# Patient Record
Sex: FEMALE | Race: WHITE
[De-identification: names, ages, dates, MRNs, and addresses within clinical notes are randomized per-mention and may not be internally consistent; named-entity substitution may affect disease eponyms.]

---

## 2019-06-13 ENCOUNTER — HOSPITAL ENCOUNTER (INPATIENT)
Dept: HOSPITAL 91 - E/R | Age: 84
LOS: 6 days | Discharge: HOME | DRG: 190 | End: 2019-06-19
Payer: SELF-PAY

## 2019-06-13 ENCOUNTER — HOSPITAL ENCOUNTER (INPATIENT)
Dept: HOSPITAL 10 - E/R | Age: 84
LOS: 6 days | Discharge: HOME | DRG: 190 | End: 2019-06-19
Attending: INTERNAL MEDICINE
Payer: SELF-PAY

## 2019-06-13 VITALS
WEIGHT: 136.03 LBS | WEIGHT: 136.03 LBS | HEIGHT: 60 IN | HEIGHT: 60 IN | BODY MASS INDEX: 26.71 KG/M2 | BODY MASS INDEX: 26.71 KG/M2

## 2019-06-13 VITALS — RESPIRATION RATE: 20 BRPM | SYSTOLIC BLOOD PRESSURE: 153 MMHG | HEART RATE: 116 BPM | DIASTOLIC BLOOD PRESSURE: 65 MMHG

## 2019-06-13 DIAGNOSIS — N39.0: ICD-10-CM

## 2019-06-13 DIAGNOSIS — Z86.11: ICD-10-CM

## 2019-06-13 DIAGNOSIS — J96.01: ICD-10-CM

## 2019-06-13 DIAGNOSIS — J44.1: Primary | ICD-10-CM

## 2019-06-13 DIAGNOSIS — J44.0: ICD-10-CM

## 2019-06-13 DIAGNOSIS — J20.9: ICD-10-CM

## 2019-06-13 DIAGNOSIS — J84.10: ICD-10-CM

## 2019-06-13 DIAGNOSIS — B96.4: ICD-10-CM

## 2019-06-13 LAB
ABNORMAL IP MESSAGE: 1
ADD MAN DIFF?: NO
ADD MAN DIFF?: YES
ADD UMIC: YES
ALANINE AMINOTRANSFERASE: 19 IU/L (ref 13–69)
ALANINE AMINOTRANSFERASE: 21 IU/L (ref 13–69)
ALBUMIN/GLOBULIN RATIO: 1.24
ALBUMIN/GLOBULIN RATIO: 1.25
ALBUMIN: 4 G/DL (ref 3.3–4.9)
ALBUMIN: 4.1 G/DL (ref 3.3–4.9)
ALKALINE PHOSPHATASE: 105 IU/L (ref 42–121)
ALKALINE PHOSPHATASE: 113 IU/L (ref 42–121)
ANION GAP: 6 (ref 5–13)
ANION GAP: 9 (ref 5–13)
ASPARTATE AMINO TRANSFERASE: 28 IU/L (ref 15–46)
ASPARTATE AMINO TRANSFERASE: 31 IU/L (ref 15–46)
B-TYPE NATRIURETIC PEPTIDE: 92 PG/ML (ref 0–450)
BAND NEUTROPHILS #M: 0.4 10^3/UL (ref 0–0.6)
BAND NEUTROPHILS % (M): 3 % (ref 0–4)
BASOPHIL #: 0.1 10^3/UL (ref 0–0.1)
BASOPHILS %: 0.4 % (ref 0–2)
BILIRUBIN,DIRECT: 0 MG/DL (ref 0–0.2)
BILIRUBIN,DIRECT: 0 MG/DL (ref 0–0.2)
BILIRUBIN,TOTAL: 0.6 MG/DL (ref 0.2–1.3)
BILIRUBIN,TOTAL: 0.6 MG/DL (ref 0.2–1.3)
BLOOD UREA NITROGEN: 10 MG/DL (ref 7–20)
BLOOD UREA NITROGEN: 11 MG/DL (ref 7–20)
CALCIUM: 8.7 MG/DL (ref 8.4–10.2)
CALCIUM: 8.9 MG/DL (ref 8.4–10.2)
CARBON DIOXIDE: 26 MMOL/L (ref 21–31)
CARBON DIOXIDE: 28 MMOL/L (ref 21–31)
CHLORIDE: 102 MMOL/L (ref 97–110)
CHLORIDE: 103 MMOL/L (ref 97–110)
CK INDEX: 4.2
CK-MB: 2.27 NG/ML (ref 0–2.4)
CREATINE KINASE: 54 IU/L (ref 23–200)
CREATININE: 0.61 MG/DL (ref 0.44–1)
CREATININE: 0.69 MG/DL (ref 0.44–1)
EOSINOPHILS #: 0.1 10^3/UL (ref 0–0.5)
EOSINOPHILS %: 0.7 % (ref 0–7)
GLOBULIN: 3.2 G/DL (ref 1.3–3.2)
GLOBULIN: 3.3 G/DL (ref 1.3–3.2)
GLUCOSE: 113 MG/DL (ref 70–220)
GLUCOSE: 225 MG/DL (ref 70–220)
HEMATOCRIT: 41 % (ref 37–47)
HEMATOCRIT: 41.1 % (ref 37–47)
HEMOGLOBIN A1C: 5.9 % (ref 0–5.9)
HEMOGLOBIN: 13.2 G/DL (ref 12–16)
HEMOGLOBIN: 13.4 G/DL (ref 12–16)
IMMATURE GRANS #M: 0.05 10^3/UL (ref 0–0.03)
IMMATURE GRANS #M: 0.05 10^3/UL (ref 0–0.03)
IMMATURE GRANS % (M): 0.3 % (ref 0–0.43)
IMMATURE GRANS % (M): 0.3 % (ref 0–0.43)
INR: 0.94
LACTIC ACID: 1.8 MMOL/L (ref 0.5–2)
LIPASE: 56 U/L (ref 23–300)
LYMPHOCYTES #: 2.5 10^3/UL (ref 0.8–2.9)
LYMPHOCYTES #M: 0.3 10^3/UL (ref 0.8–2.9)
LYMPHOCYTES % (M): 2 % (ref 15–51)
LYMPHOCYTES %: 16.6 % (ref 15–51)
MEAN CORPUSCULAR HEMOGLOBIN: 29.8 PG (ref 29–33)
MEAN CORPUSCULAR HEMOGLOBIN: 30.2 PG (ref 29–33)
MEAN CORPUSCULAR HGB CONC: 32.2 G/DL (ref 32–37)
MEAN CORPUSCULAR HGB CONC: 32.6 G/DL (ref 32–37)
MEAN CORPUSCULAR VOLUME: 92.6 FL (ref 82–101)
MEAN CORPUSCULAR VOLUME: 92.6 FL (ref 82–101)
MEAN PLATELET VOLUME: 9.3 FL (ref 7.4–10.4)
MEAN PLATELET VOLUME: 9.5 FL (ref 7.4–10.4)
MONOCYTE #: 1.2 10^3/UL (ref 0.3–0.9)
MONOCYTES %: 8.1 % (ref 0–11)
NEUTROPHIL #: 10.9 10^3/UL (ref 1.6–7.5)
NEUTROPHILS %: 73.9 % (ref 39–77)
NUCLEATED RED BLOOD CELLS #: 0 10^3/UL (ref 0–0)
NUCLEATED RED BLOOD CELLS%: 0 /100WBC (ref 0–0)
NUCLEATED RED BLOOD CELLS%: 0 /100WBC (ref 0–0)
PARTIAL THROMBOPLASTIN TIME: 28.1 SEC (ref 23–35)
PLATELET COUNT: 236 10^3/UL (ref 140–415)
PLATELET COUNT: 238 10^3/UL (ref 140–415)
PLATELET ESTIMATE: NORMAL
POLYCHROMASIA: (no result) (ref 0–0)
POSITIVE DIFF: (no result)
POTASSIUM: 3.7 MMOL/L (ref 3.5–5.1)
POTASSIUM: 3.7 MMOL/L (ref 3.5–5.1)
PROTIME: 12.7 SEC (ref 11.9–14.9)
PT RATIO: 1
RED BLOOD COUNT: 4.43 10^6/UL (ref 4.2–5.4)
RED BLOOD COUNT: 4.44 10^6/UL (ref 4.2–5.4)
RED CELL DISTRIBUTION WIDTH: 13.6 % (ref 11.5–14.5)
RED CELL DISTRIBUTION WIDTH: 13.7 % (ref 11.5–14.5)
SEG NEUT #M: 15.3 10^3/UL (ref 1.6–7.5)
SEGMENTED NEUTROPHILS (M) %: 95 % (ref 39–77)
SODIUM: 137 MMOL/L (ref 135–144)
SODIUM: 137 MMOL/L (ref 135–144)
THYROID STIMULATING HORMONE: 1.87 MIU/L (ref 0.47–4.68)
TOTAL PROTEIN: 7.2 G/DL (ref 6.1–8.1)
TOTAL PROTEIN: 7.4 G/DL (ref 6.1–8.1)
TROPONIN-I: < 0.012 NG/ML (ref 0–0.12)
TROPONIN-I: < 0.012 NG/ML (ref 0–0.12)
UR ASCORBIC ACID: NEGATIVE MG/DL
UR BACTERIA: (no result) /HPF
UR BILIRUBIN (DIP): NEGATIVE MG/DL
UR BLOOD (DIP): (no result) MG/DL
UR CLARITY: (no result)
UR COLOR: YELLOW
UR GLUCOSE (DIP): NEGATIVE MG/DL
UR KETONES (DIP): NEGATIVE MG/DL
UR LEUKOCYTE ESTERASE (DIP): (no result) LEU/UL
UR MUCUS: (no result) /HPF
UR NITRITE (DIP): NEGATIVE MG/DL
UR PH (DIP): 5 (ref 5–9)
UR RBC: 17 /HPF (ref 0–5)
UR SPECIFIC GRAVITY (DIP): 1.01 (ref 1–1.03)
UR SQUAMOUS EPITHELIAL CELL: (no result) /HPF
UR TOTAL PROTEIN (DIP): NEGATIVE MG/DL
UR UROBILINOGEN (DIP): (no result) MG/DL
UR WBC: 54 /HPF (ref 0–5)
WHITE BLOOD COUNT: 14.8 10^3/UL (ref 4.8–10.8)
WHITE BLOOD COUNT: 16 10^3/UL (ref 4.8–10.8)

## 2019-06-13 PROCEDURE — 71260 CT THORAX DX C+: CPT

## 2019-06-13 PROCEDURE — 36600 WITHDRAWAL OF ARTERIAL BLOOD: CPT

## 2019-06-13 PROCEDURE — 94664 DEMO&/EVAL PT USE INHALER: CPT

## 2019-06-13 PROCEDURE — 85610 PROTHROMBIN TIME: CPT

## 2019-06-13 PROCEDURE — 96374 THER/PROPH/DIAG INJ IV PUSH: CPT

## 2019-06-13 PROCEDURE — 87116 MYCOBACTERIA CULTURE: CPT

## 2019-06-13 PROCEDURE — 87400 INFLUENZA A/B EACH AG IA: CPT

## 2019-06-13 PROCEDURE — 83605 ASSAY OF LACTIC ACID: CPT

## 2019-06-13 PROCEDURE — 80048 BASIC METABOLIC PNL TOTAL CA: CPT

## 2019-06-13 PROCEDURE — 83880 ASSAY OF NATRIURETIC PEPTIDE: CPT

## 2019-06-13 PROCEDURE — 89220 SPUTUM SPECIMEN COLLECTION: CPT

## 2019-06-13 PROCEDURE — 86140 C-REACTIVE PROTEIN: CPT

## 2019-06-13 PROCEDURE — 87556 M.TUBERCULO DNA AMP PROBE: CPT

## 2019-06-13 PROCEDURE — 97161 PT EVAL LOW COMPLEX 20 MIN: CPT

## 2019-06-13 PROCEDURE — 87086 URINE CULTURE/COLONY COUNT: CPT

## 2019-06-13 PROCEDURE — 82803 BLOOD GASES ANY COMBINATION: CPT

## 2019-06-13 PROCEDURE — 36415 COLL VENOUS BLD VENIPUNCTURE: CPT

## 2019-06-13 PROCEDURE — 86480 TB TEST CELL IMMUN MEASURE: CPT

## 2019-06-13 PROCEDURE — 83036 HEMOGLOBIN GLYCOSYLATED A1C: CPT

## 2019-06-13 PROCEDURE — 99285 EMERGENCY DEPT VISIT HI MDM: CPT

## 2019-06-13 PROCEDURE — 71045 X-RAY EXAM CHEST 1 VIEW: CPT

## 2019-06-13 PROCEDURE — 83735 ASSAY OF MAGNESIUM: CPT

## 2019-06-13 PROCEDURE — 81001 URINALYSIS AUTO W/SCOPE: CPT

## 2019-06-13 PROCEDURE — 82306 VITAMIN D 25 HYDROXY: CPT

## 2019-06-13 PROCEDURE — 82553 CREATINE MB FRACTION: CPT

## 2019-06-13 PROCEDURE — 94640 AIRWAY INHALATION TREATMENT: CPT

## 2019-06-13 PROCEDURE — 80053 COMPREHEN METABOLIC PANEL: CPT

## 2019-06-13 PROCEDURE — 93005 ELECTROCARDIOGRAM TRACING: CPT

## 2019-06-13 PROCEDURE — 93306 TTE W/DOPPLER COMPLETE: CPT

## 2019-06-13 PROCEDURE — 84443 ASSAY THYROID STIM HORMONE: CPT

## 2019-06-13 PROCEDURE — 83690 ASSAY OF LIPASE: CPT

## 2019-06-13 PROCEDURE — 84100 ASSAY OF PHOSPHORUS: CPT

## 2019-06-13 PROCEDURE — 85730 THROMBOPLASTIN TIME PARTIAL: CPT

## 2019-06-13 PROCEDURE — 85025 COMPLETE CBC W/AUTO DIFF WBC: CPT

## 2019-06-13 PROCEDURE — 87040 BLOOD CULTURE FOR BACTERIA: CPT

## 2019-06-13 PROCEDURE — 82550 ASSAY OF CK (CPK): CPT

## 2019-06-13 PROCEDURE — 84484 ASSAY OF TROPONIN QUANT: CPT

## 2019-06-13 PROCEDURE — 85651 RBC SED RATE NONAUTOMATED: CPT

## 2019-06-13 RX ADMIN — IPRATROPIUM BROMIDE 1 MG: 0.5 SOLUTION RESPIRATORY (INHALATION) at 00:48

## 2019-06-13 RX ADMIN — LEVALBUTEROL 1 MG: 1.25 SOLUTION, CONCENTRATE RESPIRATORY (INHALATION) at 05:45

## 2019-06-13 RX ADMIN — VANCOMYCIN HYDROCHLORIDE 1 MLS/HR: 500 INJECTION, POWDER, LYOPHILIZED, FOR SOLUTION INTRAVENOUS at 09:55

## 2019-06-13 RX ADMIN — ALBUTEROL SULFATE 1 MG: 2.5 SOLUTION RESPIRATORY (INHALATION) at 00:48

## 2019-06-13 RX ADMIN — IPRATROPIUM BROMIDE 1 MG: 0.5 SOLUTION RESPIRATORY (INHALATION) at 20:53

## 2019-06-13 RX ADMIN — DEXAMETHASONE SODIUM PHOSPHATE 1 MG: 10 INJECTION, SOLUTION INTRAMUSCULAR; INTRAVENOUS at 01:28

## 2019-06-13 RX ADMIN — IPRATROPIUM BROMIDE 1 MG: 0.5 SOLUTION RESPIRATORY (INHALATION) at 05:45

## 2019-06-13 RX ADMIN — LEVALBUTEROL SCH MG: 1.25 SOLUTION, CONCENTRATE RESPIRATORY (INHALATION) at 14:15

## 2019-06-13 RX ADMIN — AZTREONAM SCH MLS/HR: 1 INJECTION, POWDER, LYOPHILIZED, FOR SOLUTION INTRAMUSCULAR; INTRAVENOUS at 23:06

## 2019-06-13 RX ADMIN — FAMOTIDINE SCH MG: 20 TABLET ORAL at 09:00

## 2019-06-13 RX ADMIN — HEPARIN SODIUM 1 UNIT: 5000 INJECTION, SOLUTION INTRAVENOUS; SUBCUTANEOUS at 15:54

## 2019-06-13 RX ADMIN — IPRATROPIUM BROMIDE 1 MG: 0.5 SOLUTION RESPIRATORY (INHALATION) at 09:26

## 2019-06-13 RX ADMIN — ACETAMINOPHEN 1 MG: 325 TABLET, FILM COATED ORAL at 01:24

## 2019-06-13 RX ADMIN — MAGNESIUM OXIDE TAB 400 MG (241.3 MG ELEMENTAL MG) 1 MG: 400 (241.3 MG) TAB at 18:48

## 2019-06-13 RX ADMIN — LEVOFLOXACIN 1 MLS/HR: 500 INJECTION, SOLUTION INTRAVENOUS at 03:29

## 2019-06-13 RX ADMIN — IPRATROPIUM BROMIDE 1 MG: 0.5 SOLUTION RESPIRATORY (INHALATION) at 03:26

## 2019-06-13 RX ADMIN — FAMOTIDINE 1 MG: 20 TABLET ORAL at 04:36

## 2019-06-13 RX ADMIN — LEVALBUTEROL SCH MG: 1.25 SOLUTION, CONCENTRATE RESPIRATORY (INHALATION) at 05:45

## 2019-06-13 RX ADMIN — MAGNESIUM OXIDE TAB 400 MG (241.3 MG ELEMENTAL MG) SCH MG: 400 (241.3 MG) TAB at 18:48

## 2019-06-13 RX ADMIN — LEVALBUTEROL 1 MG: 1.25 SOLUTION, CONCENTRATE RESPIRATORY (INHALATION) at 09:26

## 2019-06-13 RX ADMIN — HEPARIN SODIUM 1 UNIT: 5000 INJECTION, SOLUTION INTRAVENOUS; SUBCUTANEOUS at 06:20

## 2019-06-13 RX ADMIN — ALBUTEROL SULFATE 1 MG: 2.5 SOLUTION RESPIRATORY (INHALATION) at 03:27

## 2019-06-13 RX ADMIN — MAGNESIUM OXIDE TAB 400 MG (241.3 MG ELEMENTAL MG) SCH MG: 400 (241.3 MG) TAB at 20:25

## 2019-06-13 RX ADMIN — HEPARIN SODIUM SCH UNIT: 5000 INJECTION, SOLUTION INTRAVENOUS; SUBCUTANEOUS at 15:54

## 2019-06-13 RX ADMIN — AZTREONAM 1 MLS/HR: 1 INJECTION, POWDER, LYOPHILIZED, FOR SOLUTION INTRAMUSCULAR; INTRAVENOUS at 23:06

## 2019-06-13 RX ADMIN — SODIUM CHLORIDE 30 MG/ML INHALATION SOLUTION 1 ML: 30 SOLUTION INHALANT at 09:26

## 2019-06-13 RX ADMIN — IPRATROPIUM BROMIDE 1 MG: 0.5 SOLUTION RESPIRATORY (INHALATION) at 16:05

## 2019-06-13 RX ADMIN — LEVALBUTEROL 1 MG: 1.25 SOLUTION, CONCENTRATE RESPIRATORY (INHALATION) at 16:05

## 2019-06-13 RX ADMIN — AZTREONAM 1 MLS/HR: 1 INJECTION, POWDER, LYOPHILIZED, FOR SOLUTION INTRAMUSCULAR; INTRAVENOUS at 08:16

## 2019-06-13 RX ADMIN — LEVALBUTEROL SCH MG: 1.25 SOLUTION, CONCENTRATE RESPIRATORY (INHALATION) at 09:26

## 2019-06-13 RX ADMIN — LEVALBUTEROL 1 MG: 1.25 SOLUTION, CONCENTRATE RESPIRATORY (INHALATION) at 14:15

## 2019-06-13 RX ADMIN — METHYLPREDNISOLONE SODIUM SUCCINATE 1 MG: 125 INJECTION, POWDER, FOR SOLUTION INTRAMUSCULAR; INTRAVENOUS at 18:47

## 2019-06-13 RX ADMIN — LEVALBUTEROL 1 MG: 1.25 SOLUTION, CONCENTRATE RESPIRATORY (INHALATION) at 20:53

## 2019-06-13 RX ADMIN — IOHEXOL 1 ML: 300 INJECTION, SOLUTION INTRAVENOUS at 02:50

## 2019-06-13 RX ADMIN — SODIUM CHLORIDE 1 ML: 9 INJECTION, SOLUTION INTRAMUSCULAR; INTRAVENOUS; SUBCUTANEOUS at 02:50

## 2019-06-13 RX ADMIN — MAGNESIUM OXIDE TAB 400 MG (241.3 MG ELEMENTAL MG) 1 MG: 400 (241.3 MG) TAB at 20:25

## 2019-06-13 RX ADMIN — HEPARIN SODIUM SCH UNIT: 5000 INJECTION, SOLUTION INTRAVENOUS; SUBCUTANEOUS at 06:20

## 2019-06-13 RX ADMIN — LIDOCAINE HYDROCHLORIDE 1 MLS/HR: 10 INJECTION, SOLUTION EPIDURAL; INFILTRATION; INTRACAUDAL; PERINEURAL at 09:55

## 2019-06-13 RX ADMIN — FAMOTIDINE SCH MG: 20 TABLET ORAL at 04:36

## 2019-06-13 RX ADMIN — FAMOTIDINE 1 MG: 20 TABLET ORAL at 09:00

## 2019-06-13 RX ADMIN — AZTREONAM SCH MLS/HR: 1 INJECTION, POWDER, LYOPHILIZED, FOR SOLUTION INTRAMUSCULAR; INTRAVENOUS at 08:16

## 2019-06-13 RX ADMIN — IPRATROPIUM BROMIDE 1 MG: 0.5 SOLUTION RESPIRATORY (INHALATION) at 14:15

## 2019-06-13 RX ADMIN — VASOPRESSIN 1: 20 INJECTION, SOLUTION INTRAMUSCULAR; SUBCUTANEOUS at 02:50

## 2019-06-13 RX ADMIN — LEVALBUTEROL SCH MG: 1.25 SOLUTION, CONCENTRATE RESPIRATORY (INHALATION) at 20:53

## 2019-06-13 RX ADMIN — LEVALBUTEROL SCH MG: 1.25 SOLUTION, CONCENTRATE RESPIRATORY (INHALATION) at 16:05

## 2019-06-13 NOTE — PN
Date/Time of Note


Date/Time of Note


DATE: 6/13/19 


TIME: 17:04





Assessment/Plan


VTE Prophylaxis


SCD contraindicated:  low risk/ambulating


Pharmacological prophylaxis:  LMWH





Lines/Catheters


IV Catheter Type (from Nrs):  Peripheral IV





Assessment/Plan


Hospital Course





Assessment and plan


1.  Dyspnea, fever, bronchitis versus pneumonia.  Antibiotics, follow-up on 


cultures


2.  History of tuberculosis.  Treated in [place] about 10 years ago.


3.  Weight loss 5 to 8 pounds.


4.  COPD?


5.  Acute hypoxic respiratory failure, stable continue antibiotics





S: Dyspnea low-grade fever.  Mild/moderate weight loss only.  States she was 


treated many years ago for tuberculosis.  She started medical therapy in 


[place] and finished treatment in [place].  Denies any ill contacts.  Did 


recently travel here from [place].  No edema chest pain.  Diet has been okay.  


[place] and [place].





O: vss; some tachycardia





Physical exam


No pallor adenopathy JVD


Regular some tachycardia no rub no gallop


Scattered wheeze bilaterally


Bowel sounds present nontender nondistended no RRG


No edema or Homans


Result Diagram:  


6/13/19 0430                                                                    


           6/13/19 0430





Results 24hrs





Laboratory Tests


Test
                 6/13/19
00:20  6/13/19
00:32  6/13/19
00:36  6/13/19
04:30


Urine Color        YELLOW


Urine Clarity
     SLIGHTLY
CLOUDY   
              
              



                   A


Urine pH                      5.0


Urine Specific              1.015


Gravity


Urine Ketones      NEGATIVE


Urine Nitrite      NEGATIVE


Urine Bilirubin    NEGATIVE


Urine                         1+  H


Urobilinogen


Urine Leukocyte               3+  H


Esterase


Urine Microscopic             17  H


RBC


Urine Microscopic             54  H


WBC


Urine Squamous     FEW  
            
              
              



Epithelial
Cells


Urine Bacteria     FEW  A


Urine Mucus        FEW  A


Urine Hemoglobin              2+  H


Urine Glucose      NEGATIVE


Urine Total        NEGATIVE


Protein


White Blood Count                          14.8  H                       16.0  H


Red Blood Count                             4.43                          4.44


Hemoglobin                                  13.2                          13.4


Hematocrit                                  41.0                          41.1


Mean Corpuscular                            92.6                          92.6


Volume


Mean Corpuscular                            29.8                          30.2


Hemoglobin


Mean Corpuscular   
                       32.2  
  
                    32.6  



Hemoglobin
Concen


t


Red Cell                                    13.7                          13.6


Distribution


Width


Platelet Count                               238                           236


Mean Platelet                                9.5                           9.3


Volume


Immature                                   0.300                         0.300


Granulocytes %


Neutrophils %                               73.9


Lymphocytes %                               16.6


Monocytes %                                  8.1


Eosinophils %                                0.7


Basophils %                                  0.4


Nucleated Red                                0.0                           0.0


Blood Cells %


Immature                                  0.050  H                      0.050  H


Granulocytes #


Neutrophils #                              10.9  H


Lymphocytes #                                2.5


Monocytes #                                 1.2  H


Eosinophils #                                0.1


Basophils #                                  0.1


Nucleated Red                                0.0


Blood Cells #


Prothrombin Time                            12.7


Prothrombin Time                             1.0


Ratio


INR International  
                       0.94  
  
              



Normalized
Ratio


Activated          
                       28.1  
  
              



Partial
Thrombopl


ast Time


Sodium Level                                 137                           137


Potassium Level                              3.7                           3.7


Chloride Level                               103                           102


Carbon Dioxide                                28                            26


Level


Anion Gap                                      6                             9


Blood Urea                                    11                            10


Nitrogen


Creatinine                                  0.69                          0.61


Est Glomerular     
                   
            
                



Filtrat


Rate
mL/min


Glucose Level                                113                         225  #H


Calcium Level                                8.9                           8.7


Total Bilirubin                              0.6                           0.6


Direct Bilirubin                            0.00                          0.00


Indirect                                     0.6                           0.6


Bilirubin


Aspartate Amino    
                         31  
  
                      28  



Transf
(AST/SGOT)


Alanine            
                         21  
  
                      19  



Aminotransferase



(ALT/SGPT)


Alkaline                                     105                           113


Phosphatase


Troponin I                           < 0.012


Total Protein                                7.4                           7.2


Albumin                                      4.1                           4.0


Globulin                                   3.30  H                        3.20


Albumin/Globulin                            1.24                          1.25


Ratio


Lipase                                        56


POC Venous                                                  0.9


Lactate


Segmented          
                 
              
                     95  H



Neutrophils


%
(Manual)


Band Neutrophils                                                             3


% (Manual)


Lymphocytes %                                                               2  L


(Manual)


Neutrophils #                                                            15.3  H


(Manual)


Band Neutrophils                                                           0.4


#


Lymphocytes                                                               0.3  L


(Manual)


Platelet Estimate                                                  NORMAL


Polychromasia                                                               1+


Hemoglobin A1c                                                             5.9


Lactic Acid Level                                                          1.8


B-Type                                                                      92


Natriuretic


Peptide


Thyroid            
                 
              
                   1.870  



Stimulating


Hormone
(TSH)


Test
                 6/13/19
08:00  6/13/19
08:21  
              



Blood Gas          Blood arterial
   
              
              



Specimen Source



Arterial Blood     6/13/2019
8:19:0  
              
              



Date Drawn
                    0 AM


Arterial Blood pH          7.427  
  
              
              



(Temp
corrected)


Arterial Blood             33.2  L
  
              
              



pCO2


(Temp
correct)


Arterial Blood             78.0  L
  
              
              



pO2


(Temp
corrected)


Arterial Blood              21.4  L


HCO3


Arterial Blood               -2.2


Base Excess


Arterial Blood             94.2  L
  
              
              



Oxygen
Saturation


Hector Test         ACCEPTAB


Arterial Blood     Right Radial  
   
              
              



Gas Puncture
Site


Arterial                     0.5  
  
              
              



Blood
Carboxyhemo


globin


Arterial Blood                0.5


Methemoglobin


Blood Gas A-a O2           169.0  H


Differential


Oxyhemoglobin                93.3


Percent


Blood Gas                    37.0


Temperature


Blood Gas                    20.0


Respiration Rate


Blood Gas Actual              20  
  
              
              



Respiration
Rate


Blood Gas          VENT - AC


Modality


FiO2                         40.0


Blood Gas Tidal             550.0


Volume


Blood Gas Low                 5.0


PEEP Setting


Blood Gas          CW


Notified Whom


Blood Gas          6/13/2019
8:27:0  
              
              



Notified Time
                 0 AM


Creatine Kinase                               54


Creatine Kinase                              4.2


Index


Creatinine Kinase                           2.27


MB (Mass)


Troponin I                           < 0.012








Exam/Review of Systems


Exam


Vitals





Vital Signs


  Date      Temp  Pulse  Resp  B/P (MAP)   Pulse Ox  O2          O2 Flow    FiO2


Time                                                 Delivery    Rate


   6/13/19          111    20                   100  Nasal             3.0


     16:06                                           Cannula


   6/13/19  97.9                   115/68


     14:00                           (84)


   6/13/19                                                                    21


     00:50








Results


Results 24hrs





Laboratory Tests


Test
                 6/13/19
00:20  6/13/19
00:32  6/13/19
00:36  6/13/19
04:30


Urine Color        YELLOW


Urine Clarity
     SLIGHTLY
CLOUDY   
              
              



                   A


Urine pH                      5.0


Urine Specific              1.015


Gravity


Urine Ketones      NEGATIVE


Urine Nitrite      NEGATIVE


Urine Bilirubin    NEGATIVE


Urine                         1+  H


Urobilinogen


Urine Leukocyte               3+  H


Esterase


Urine Microscopic             17  H


RBC


Urine Microscopic             54  H


WBC


Urine Squamous     FEW  
            
              
              



Epithelial
Cells


Urine Bacteria     FEW  A


Urine Mucus        FEW  A


Urine Hemoglobin              2+  H


Urine Glucose      NEGATIVE


Urine Total        NEGATIVE


Protein


White Blood Count                          14.8  H                       16.0  H


Red Blood Count                             4.43                          4.44


Hemoglobin                                  13.2                          13.4


Hematocrit                                  41.0                          41.1


Mean Corpuscular                            92.6                          92.6


Volume


Mean Corpuscular                            29.8                          30.2


Hemoglobin


Mean Corpuscular   
                       32.2  
  
                    32.6  



Hemoglobin
Concen


t


Red Cell                                    13.7                          13.6


Distribution


Width


Platelet Count                               238                           236


Mean Platelet                                9.5                           9.3


Volume


Immature                                   0.300                         0.300


Granulocytes %


Neutrophils %                               73.9


Lymphocytes %                               16.6


Monocytes %                                  8.1


Eosinophils %                                0.7


Basophils %                                  0.4


Nucleated Red                                0.0                           0.0


Blood Cells %


Immature                                  0.050  H                      0.050  H


Granulocytes #


Neutrophils #                              10.9  H


Lymphocytes #                                2.5


Monocytes #                                 1.2  H


Eosinophils #                                0.1


Basophils #                                  0.1


Nucleated Red                                0.0


Blood Cells #


Prothrombin Time                            12.7


Prothrombin Time                             1.0


Ratio


INR International  
                       0.94  
  
              



Normalized
Ratio


Activated          
                       28.1  
  
              



Partial
Thrombopl


ast Time


Sodium Level                                 137                           137


Potassium Level                              3.7                           3.7


Chloride Level                               103                           102


Carbon Dioxide                                28                            26


Level


Anion Gap                                      6                             9


Blood Urea                                    11                            10


Nitrogen


Creatinine                                  0.69                          0.61


Est Glomerular     
                   
            
                



Filtrat


Rate
mL/min


Glucose Level                                113                         225  #H


Calcium Level                                8.9                           8.7


Total Bilirubin                              0.6                           0.6


Direct Bilirubin                            0.00                          0.00


Indirect                                     0.6                           0.6


Bilirubin


Aspartate Amino    
                         31  
  
                      28  



Transf
(AST/SGOT)


Alanine            
                         21  
  
                      19  



Aminotransferase



(ALT/SGPT)


Alkaline                                     105                           113


Phosphatase


Troponin I                           < 0.012


Total Protein                                7.4                           7.2


Albumin                                      4.1                           4.0


Globulin                                   3.30  H                        3.20


Albumin/Globulin                            1.24                          1.25


Ratio


Lipase                                        56


POC Venous                                                  0.9


Lactate


Segmented          
                 
              
                     95  H



Neutrophils


%
(Manual)


Band Neutrophils                                                             3


% (Manual)


Lymphocytes %                                                               2  L


(Manual)


Neutrophils #                                                            15.3  H


(Manual)


Band Neutrophils                                                           0.4


#


Lymphocytes                                                               0.3  L


(Manual)


Platelet Estimate                                                  NORMAL


Polychromasia                                                               1+


Hemoglobin A1c                                                             5.9


Lactic Acid Level                                                          1.8


B-Type                                                                      92


Natriuretic


Peptide


Thyroid            
                 
              
                   1.870  



Stimulating


Hormone
(TSH)


Test
                 6/13/19
08:00  6/13/19
08:21  
              



Blood Gas          Blood arterial
   
              
              



Specimen Source



Arterial Blood     6/13/2019
8:19:0  
              
              



Date Drawn
                    0 AM


Arterial Blood pH          7.427  
  
              
              



(Temp
corrected)


Arterial Blood             33.2  L
  
              
              



pCO2


(Temp
correct)


Arterial Blood             78.0  L
  
              
              



pO2


(Temp
corrected)


Arterial Blood              21.4  L


HCO3


Arterial Blood               -2.2


Base Excess


Arterial Blood             94.2  L
  
              
              



Oxygen
Saturation


Hector Test         ACCEPTAB


Arterial Blood     Right Radial  
   
              
              



Gas Puncture
Site


Arterial                     0.5  
  
              
              



Blood
Carboxyhemo


globin


Arterial Blood                0.5


Methemoglobin


Blood Gas A-a O2           169.0  H


Differential


Oxyhemoglobin                93.3


Percent


Blood Gas                    37.0


Temperature


Blood Gas                    20.0


Respiration Rate


Blood Gas Actual              20  
  
              
              



Respiration
Rate


Blood Gas          VENT - AC


Modality


FiO2                         40.0


Blood Gas Tidal             550.0


Volume


Blood Gas Low                 5.0


PEEP Setting


Blood Gas          


Notified Whom


Blood Gas          6/13/2019
8:27:0  
              
              



Notified Time
                 0 AM


Creatine Kinase                               54


Creatine Kinase                              4.2


Index


Creatinine Kinase                           2.27


MB (Mass)


Troponin I                           < 0.012








Medications


Medication





Current Medications


Levalbuterol (Xopenex Neb) 1.25 mg Q4H RESP  THERAPY HHN  Last administered on 


6/13/19at 16:05; Admin Dose 1.25 MG;  Start 6/13/19 at 05:00


Ipratropium Bromide (Atrovent 0.02% (Neb)) 0.5 mg Q4H RESP  THERAPY HHN  Last 


administered on 6/13/19at 16:05; Admin Dose 0.5 MG;  Start 6/13/19 at 05:00


Famotidine (Pepcid) 20 mg BID PO  Last administered on 6/13/19at 04:36; Admin 


Dose 20 MG;  Start 6/13/19 at 03:00


IV Flush (NS 3 ml) 3 ml PER PROTOCOL IV ;  Start 6/13/19 at 03:00


Ondansetron HCl (Zofran Inj) 4 mg Q6H  PRN IV NAUSEA/VOMITING;  Start 6/13/19 at


03:00


Acetaminophen (Tylenol Tab) 650 mg Q6H  PRN PO .PAIN 1-3 OR TEMP;  Start 6/13/19


at 03:00


Docusate Sodium (Colace) 100 mg Q12H  PRN PO .CONSTIPATION;  Start 6/13/19 at 


03:00


Bisacodyl (Dulcolax) 5 mg DAILY  PRN PO .CONSTIPATION;  Start 6/13/19 at 03:00


Heparin Sodium (Porcine) (Heparin  (5000 Units/1ml)) 5,000 unit Q8 SC  Last 


administered on 6/13/19at 15:54; Admin Dose 5,000 UNIT;  Start 6/13/19 at 06:00


Aztreonam 50 ml @  100 mls/hr Q12 IVPB  Last administered on 6/13/19at 08:16; 


Admin Dose 100 MLS/HR;  Start 6/13/19 at 09:00


Vancomycin HCl (Vanco Iv Per Pharmacy) VANCOMYCIN PER PHARMACY PER PROTOCOL XX ;


 Start 6/13/19 at 07:30


Prednisone (Prednisone) 40 mg DAILY PO  Last administered on 6/13/19at 09:56; 


Admin Dose 40 MG;  Start 6/13/19 at 09:00


Vancomycin HCl 1.25 gm/Sodium Chloride 250 ml @  83.333 mls/ hr ONCE IVPB  Last 


administered on 6/13/19at 09:55; Admin Dose 83.333 MLS/HR;  Start 6/13/19 at 


09:00;  Stop 6/13/19 at 18:00


Vancomycin/Sodium Chloride 250 ml @  125 mls/hr Q24H IVPB ;  Start 6/14/19 at 


10:00











MELL PRADO MD         Jun 13, 2019 17:06

## 2019-06-13 NOTE — HP
Date/Time of Note


Date/Time of Note


DATE: 6/13/19 


TIME: 02:45





Assessment/Plan


VTE Prophylaxis


Pharmacological prophylaxis:  heparin





Lines/Catheters


IV Catheter Type (from [facility]):  Peripheral IV





Assessment/Plan


Hospital Course


This is a 86-year-old female being admitted to the telemetry floor for:





#1  Suspect sepsis: Patient did present with a temp of 100.2.  She was also 


tachycardic, tachynpniec x-rays concerning for possible underlying pneumonia 


bronchitis.  Given also patient's night sweats and recent arrival from [place] we


will also assess for underlying tuberculosis.  Broad-spectrum antibiotics of 


vancomycin and aztreonam at the current time given patient's penicillin allergy.


 Will isolate the patient.  We will test for acid-fast bacilli every 8 hours x3,


QuantiFERON, MTB.  Blood cultures are pending.





#2 hypoxia: bronchtis, pna, vs other? Patient is requiring approximately 3 L 


supplemental O2.  She does have diminished breath sounds bilaterally.  Scheduled


nebulizers of levaalbuterol and ipratropium.  Steroid burst prednisone 40 mg 


p.o. daily.  Patient's chest x-ray does show a possible hilar mass.  We will 


proceed with a CT of the chest with and without contrast to further evaluate.





#3 urinary tract infection: Currently on vancomycin and Zosyn, await urine 


culture results





#4 DVT GI prophylaxis: Heparin subcu, Protonix





Further treatment strategy will be implemented as per the clinical course.


Result Diagram:  


6/13/19 0032                                                                    


           6/13/19 0032





Results 24hrs





Laboratory Tests


Test
                                6/13/19
00:20  6/13/19
00:32  6/13/19
00:36


Urine Color                     YELLOW


Urine Clarity
                  SLIGHTLY
CLOUDY  A  
              



Urine pH                                     5.0


Urine Specific Gravity                     1.015


Urine Ketones                   NEGATIVE


Urine Nitrite                   NEGATIVE


Urine Bilirubin                 NEGATIVE


Urine Urobilinogen                           1+  H


Urine Leukocyte Esterase                     3+  H


Urine Microscopic RBC                        17  H


Urine Microscopic WBC                        54  H


Urine Squamous                  FEW  
              
              



Epithelial
Cells


Urine Bacteria                  FEW  A


Urine Mucus                     FEW  A


Urine Hemoglobin                             2+  H


Urine Glucose                   NEGATIVE


Urine Total Protein             NEGATIVE


White Blood Count                                         14.8  H


Red Blood Count                                            4.43


Hemoglobin                                                 13.2


Hematocrit                                                 41.0


Mean Corpuscular Volume                                    92.6


Mean Corpuscular Hemoglobin                                29.8


Mean Corpuscular                
                         32.2  
  



Hemoglobin
Concent


Red Cell Distribution Width                                13.7


Platelet Count                                              238


Mean Platelet Volume                                        9.5


Immature Granulocytes %                                   0.300


Neutrophils %                                              73.9


Lymphocytes %                                              16.6


Monocytes %                                                 8.1


Eosinophils %                                               0.7


Basophils %                                                 0.4


Nucleated Red Blood Cells %                                 0.0


Immature Granulocytes #                                  0.050  H


Neutrophils #                                             10.9  H


Lymphocytes #                                               2.5


Monocytes #                                                1.2  H


Eosinophils #                                               0.1


Basophils #                                                 0.1


Nucleated Red Blood Cells #                                 0.0


Prothrombin Time                                           12.7


Prothrombin Time Ratio                                      1.0


INR International               
                         0.94  
  



Normalized
Ratio


Activated Partial
Thromboplast  
                         28.1  
  



Time


Sodium Level                                                137


Potassium Level                                             3.7


Chloride Level                                              103


Carbon Dioxide Level                                         28


Anion Gap                                                     6


Blood Urea Nitrogen                                          11


Creatinine                                                 0.69


Est Glomerular Filtrat          
                     
            



Rate
mL/min


Glucose Level                                               113


Calcium Level                                               8.9


Total Bilirubin                                             0.6


Direct Bilirubin                                           0.00


Indirect Bilirubin                                          0.6


Aspartate Amino                 
                           31  
  



Transf
(AST/SGOT)


Alanine                         
                           21  
  



Aminotransferase
(ALT/SGPT)


Alkaline Phosphatase                                        105


Troponin I                                          < 0.012


Total Protein                                               7.4


Albumin                                                     4.1


Globulin                                                  3.30  H


Albumin/Globulin Ratio                                     1.24


Lipase                                                       56


POC Venous Lactate                                                         0.9








HPI/ROS


Admit Date/Time


Admit Date/Time








Hx of Present Illness


Chief complaint: Cough x3 days, phlegm





This is a 86-year-old female with no past medical history according to her and 


the family who comes today with cough x3 days and phlegm.  Patient was present 


with the family at the bedside.  The family states that the patient is a visitor


from [place] and came over from [place] approximately 1 week ago.  Patient has 


been complaining of cough and phlegm for approximately 3 days.  She denies any 


fevers.  But she does report night sweats.  She denies any chest pain or 


hemoptysis.





Allergies: Penicillin





Medications: None





ROS


Const: As per HPI


Eyes : No pain discharge or redness or change in visual acuity


ENT: No pain, sore throat, congestion, congestion,  dysphagia or discharge


Respiratory: As per HPI


Cardiovascular: No chest pain, palpitation, PND, or edema


GI : no change in appetite, abdominal pain, nausea, vomiting, diarrhea, 


constipation, or change in the color his stool 


Genitourinary: No dysuria, hematuria, flank pain ,  discharge or CVA tenderness


Musculoskeletal: No joint pain, back pain, neck pain, restricted range of motion


in neck or joints


Skin: No rash, bruising or hives 


Neuro: No headache, dizziness, syncope, seizure, focal weakness


Endocrine: No polyuria, polydipsia, temperature intolerance


Psych: No hallucination, depression, anxiety or suicidal ideation





PMH/Family/Social


Past Medical History


Medical History:  no pertinent history


Medications





Current Medications


Ondansetron HCl (Zofran Inj) 4 mg BRIDGE ORDER PRN IV NAUSEA/VOMITING;  Start 


6/13/19 at 02:00;  Stop 6/14/19 at 01:59


Acetaminophen (Tylenol Tab) 650 mg ER BRIDGE PRN PO .MILD PAIN 1-3 OR TEMP;  


Start 6/13/19 at 02:00;  Stop 6/14/19 at 01:59


Levofloxacin/ Dextrose 100 ml @  100 mls/hr ONCE  ONCE IVPB ;  Start 6/13/19 at 


02:00;  Stop 6/13/19 at 02:59


Levalbuterol (Xopenex Neb) 1.25 mg Q4H HHN ;  Start 6/13/19 at 03:00;  Status 


UNV


Ipratropium Bromide (Atrovent 0.02% (Neb)) 0.5 mg Q4H RESP  THERAPY HHN ;  Start


6/13/19 at 05:00;  Status UNV


Prednisone (Prednisone) 40 mg DAILY PO ;  Start 6/13/19 at 09:00;  Stop 6/18/19 


at 08:59;  Status UNV


Famotidine (Pepcid) 20 mg BID PO ;  Start 6/13/19 at 03:00;  Status UNV


Coded Allergies:  


     Penicillins (Verified  Allergy, Unknown, 6/13/19)





Past Surgical History


Past Surgical Hx:  no surgical history





Family History


Significant Family History:  no pertinent family hx





Social History


Alcohol Use:  none


Smoking Status:  Never smoker


Drug Use:  none





Exam/Review of Systems


Vital Signs


Vitals





Vital Signs


  Date      Temp   Pulse  Resp  B/P (MAP)   Pulse Ox  O2         O2 Flow    FiO2


Time                                                  Delivery   Rate


   6/13/19           112    23      113/75       100  Nasal            2.0


     02:31                            (88)            Cannula


   6/13/19                                                                    21


     00:50


   6/13/19  100.2


     00:15








Exam


Exam





General: Pleasant female currently lying in bed, she does not appear to be in 


any acute respiratory distress, she is currently on 3 L O2 of


HEENT: Atraumatic, normocephalic. The pupils are equal, round and reactive. 


Extraocular motor are intact


Neck: Supple with full range of motion. No rigidity or meningismus


Chest: Nontender


Lungs: Diminished breath sounds bilaterally, coarse


Heart:  sinus tachycardia


Abdomen: Soft , nontender,  nondistended , bowel sounds are present. No guarding


no rebound tenderness , No masses or organomegaly. No costovertebral temporal a


ngle mass


Extremities: Normal to inspection, no edema no cyanosis


Neurologic: Normal mental status, speech normal, cranial nerves II through XII 


are intact, motor and sensory are intact,


Additional Comments


EKG: Sinus tachycardia at approximately 127 bpm, no ST or T wave noise c


oncerning for acute ischemia





PROCEDURE:   XR Chest. 


 


CLINICAL INDICATION:    Possible sepsis


 


TECHNIQUE:   Single frontal view  of the chest was obtained 


 


COMPARISON:   None 


 


FINDINGS:


There is appearance of minimal enlargement of the cardiac silhouette. 


Calcification in the aortic arch. There is minimal prominence of the lung 


interstitium likely minimal chronic changes. There is the suggestion of mild 


prominence of the right hilum. A right hilar mass is possible. Possible right 


lung apical linear fibrotic changes. Likely linear atelectasis/fibrosis at the 


left lung base. There is the suggestion of tenting of the right hemidiaphragm. 


ECG leads project over the chest. Small bilateral pleural effusions left larger 


than right. Degenerative enthesopathy in thoracic spine.


 


 


 


IMPRESSION:


Minimal enlargement of the cardiac silhouette. Suggestion of mild prominence of 


the right hilum. A right hilar mass is possible. Small bilateral pleural 


effusions left larger than right. CT chest with contrast may be useful for 


further evaluation. Please see above.


 


 


RPTAT: HJES


_____________________________________________ 


.Joseph Orellana MD, MD           Date    Time 


Electronically viewed and signed by .Joseph Orellana MD, MD on 06/13/2019 01:48 


 


D:  06/13/2019 01:48  T:  06/13/2019 01:48


.S/





CC: WALKER TAYLOR





[phone]











LILIAN CALDERA                 Jun 13, 2019 02:46

## 2019-06-13 NOTE — RADRPT
Echocardiogram Report

 

Patient Name: Angeles RENEE ID: [id number]

: 1933 (86y 2m)Study Date: 2019 8:25:26 AM

Gender: FAccession #: [id number]

Tech: MT                                  Location: [facility]

Ref.Physician: LILIAN CALDERA            Height(Cm):            

 

BSA: Weight(Kg):

Quality: GoodOrder Physician: LILIAN CALDERA

Account #:

 

 

Procedures:

 

Echocardiographic Report:

Transthoracic echocardiogram with complete 2D, M-Mode, and doppler 

examination.

 

Indications:

 

Shortness of breath, and Tachycardia.

 

 

Measurements:

2D/M Mode                                   Doppler                                               

Measurement    Value    Normal Range        Measurement      Value    Normal Range                

LVIDd 2D       4.0      [ 3.8 - 5.2 ] cm    AV Peak Chuy      1.9      [ 100.0 - 170.0 ] cm/sec    

LVIDs 2D       2.1      [ 2.2 - 3.5 ] cm    AV Peak PG       14.0     [ 2.0 - 9.0 ] mmHg          

LVPWd 2D       0.9      [ 0.6 - 0.9 ] cm    LVOT Peak Chuy    1.2      [ 70.0 - 110.0 ] cm/sec     

IVSd 2D        1.0      [ 0.6 - 0.9 ] cm    LVOT Peak PG     6.0      [ 2.0 - 6.0 ] mmHg          

IVS/LVPW 2D    1.1      ratio               MV E Peak Chuy    0.8      [ 60.0 - 130.0 ] cm/sec     

AoR Diam 2D    2.6      [ 2.3 - 3.1 ] cm    MV A Peak Chuy    1.2      [ 100.0 - 120.0 ] cm/sec    

LA/Ao 2D       1        ratio               MV E/A           0.7      [ 0.8 - 1.5 ] ratio         

LA Dimen 2D    3.0      [ 2.7 - 3.8 ] cm    MV Decel Time    120      [ 104 - 258 ] msec          

                                            MV E/A           0.7      [ 0.8 - 1.5 ] ratio         

 

Findings:

 

Left Ventricle:

Normal left ventricular systolic function. Normal left ventricular 

cavity size. Normal left ventricular wall thickness. Ejection fraction 

is visually estimated at 60 %. Tissue Doppler/Mitral Doppler indices are 

consistent with impaired relaxation (Stage I diastolic dysfunction).

 

Right Ventricle:

Normal right ventricular size. Normal right ventricular systolic 

function.

 

Left Atrium:

The left atrium is normal in size.

 

Right Atrium:

The right atrium is normal in size.

 

Mitral Valve:

Normal appearance and function of the mitral valve with trace 

physiologic regurgitation.

 

Aortic Valve:

Normal appearance of the aortic valve. No significant aortic stenosis or 

insufficiency.

 

Tricuspid Valve:

Normal appearance of the tricuspid valve. Unable to obtain RVSP due to 

minimal presence of tricuspid regurgitation.

 

Pulmonic Valve:

Normal pulmonic valve appearance.

 

Pericardium:

Trivial pericardial effusion.

 

Aorta:

Normal aortic root.

 

IVC:

Normal size and normal respiratory collapse consistent with normal right 

atrial pressure.

 

 

Conclusions:

 

Normal left ventricular systolic function. Normal left ventricular 

cavity size. Normal left ventricular wall thickness. Ejection fraction 

is visually estimated at 60 %. Tissue Doppler/Mitral Doppler indices are 

consistent with impaired relaxation (Stage I diastolic dysfunction).

 

Normal appearance and function of the mitral valve with trace 

physiologic regurgitation.

 

Normal appearance of the tricuspid valve. Unable to obtain RVSP due to 

minimal presence of tricuspid regurgitation.

 

Electronically Signed By:

 

Michi Godoy

2019 22:12:47 PDT

## 2019-06-14 VITALS — RESPIRATION RATE: 20 BRPM | DIASTOLIC BLOOD PRESSURE: 56 MMHG | HEART RATE: 100 BPM | SYSTOLIC BLOOD PRESSURE: 125 MMHG

## 2019-06-14 VITALS — DIASTOLIC BLOOD PRESSURE: 60 MMHG | HEART RATE: 113 BPM | SYSTOLIC BLOOD PRESSURE: 140 MMHG | RESPIRATION RATE: 18 BRPM

## 2019-06-14 VITALS — RESPIRATION RATE: 26 BRPM | HEART RATE: 26 BPM | SYSTOLIC BLOOD PRESSURE: 133 MMHG | DIASTOLIC BLOOD PRESSURE: 57 MMHG

## 2019-06-14 VITALS — RESPIRATION RATE: 17 BRPM | DIASTOLIC BLOOD PRESSURE: 74 MMHG | SYSTOLIC BLOOD PRESSURE: 144 MMHG | HEART RATE: 102 BPM

## 2019-06-14 LAB
ADD MAN DIFF?: NO
ALANINE AMINOTRANSFERASE: 23 IU/L (ref 13–69)
ALBUMIN/GLOBULIN RATIO: 1.22
ALBUMIN: 3.8 G/DL (ref 3.3–4.9)
ALKALINE PHOSPHATASE: 98 IU/L (ref 42–121)
ANION GAP: 4 (ref 5–13)
ASPARTATE AMINO TRANSFERASE: 40 IU/L (ref 15–46)
BASOPHIL #: 0 10^3/UL (ref 0–0.1)
BASOPHILS %: 0.1 % (ref 0–2)
BILIRUBIN,DIRECT: 0 MG/DL (ref 0–0.2)
BILIRUBIN,TOTAL: 0.6 MG/DL (ref 0.2–1.3)
BLOOD UREA NITROGEN: 17 MG/DL (ref 7–20)
CALCIUM: 9.4 MG/DL (ref 8.4–10.2)
CARBON DIOXIDE: 28 MMOL/L (ref 21–31)
CHLORIDE: 108 MMOL/L (ref 97–110)
CREATININE: 0.6 MG/DL (ref 0.44–1)
EOSINOPHILS #: 0 10^3/UL (ref 0–0.5)
EOSINOPHILS %: 0 % (ref 0–7)
GLOBULIN: 3.1 G/DL (ref 1.3–3.2)
GLUCOSE: 150 MG/DL (ref 70–220)
HEMATOCRIT: 38.7 % (ref 37–47)
HEMOGLOBIN: 12.8 G/DL (ref 12–16)
IMMATURE GRANS #M: 0.08 10^3/UL (ref 0–0.03)
IMMATURE GRANS % (M): 0.4 % (ref 0–0.43)
LYMPHOCYTES #: 0.8 10^3/UL (ref 0.8–2.9)
LYMPHOCYTES %: 4.2 % (ref 15–51)
MAGNESIUM: 2.3 MG/DL (ref 1.7–2.5)
MEAN CORPUSCULAR HEMOGLOBIN: 30.2 PG (ref 29–33)
MEAN CORPUSCULAR HGB CONC: 33.1 G/DL (ref 32–37)
MEAN CORPUSCULAR VOLUME: 91.3 FL (ref 82–101)
MEAN PLATELET VOLUME: 9.9 FL (ref 7.4–10.4)
MONOCYTE #: 0.3 10^3/UL (ref 0.3–0.9)
MONOCYTES %: 1.8 % (ref 0–11)
NEUTROPHIL #: 16.9 10^3/UL (ref 1.6–7.5)
NEUTROPHILS %: 93.5 % (ref 39–77)
NUCLEATED RED BLOOD CELLS #: 0 10^3/UL (ref 0–0)
NUCLEATED RED BLOOD CELLS%: 0 /100WBC (ref 0–0)
PHOSPHORUS: 2.4 MG/DL (ref 2.5–4.9)
PLATELET COUNT: 247 10^3/UL (ref 140–415)
POTASSIUM: 3.9 MMOL/L (ref 3.5–5.1)
RED BLOOD COUNT: 4.24 10^6/UL (ref 4.2–5.4)
RED CELL DISTRIBUTION WIDTH: 13.7 % (ref 11.5–14.5)
SODIUM: 140 MMOL/L (ref 135–144)
THYROID STIMULATING HORMONE: 1.09 MIU/L (ref 0.47–4.68)
TOTAL PROTEIN: 6.9 G/DL (ref 6.1–8.1)
TROPONIN-I: 0.02 NG/ML (ref 0–0.12)
WHITE BLOOD COUNT: 18.1 10^3/UL (ref 4.8–10.8)

## 2019-06-14 RX ADMIN — LEVALBUTEROL 1 MG: 1.25 SOLUTION, CONCENTRATE RESPIRATORY (INHALATION) at 12:20

## 2019-06-14 RX ADMIN — LEVALBUTEROL SCH MG: 1.25 SOLUTION, CONCENTRATE RESPIRATORY (INHALATION) at 08:00

## 2019-06-14 RX ADMIN — METHYLPREDNISOLONE SODIUM SUCCINATE 1 MG: 125 INJECTION, POWDER, FOR SOLUTION INTRAMUSCULAR; INTRAVENOUS at 09:08

## 2019-06-14 RX ADMIN — LEVALBUTEROL 1 MG: 1.25 SOLUTION, CONCENTRATE RESPIRATORY (INHALATION) at 01:12

## 2019-06-14 RX ADMIN — GUAIFENESIN AND DEXTROMETHORPHAN 1 ML: 100; 10 SYRUP ORAL at 15:16

## 2019-06-14 RX ADMIN — MAGNESIUM OXIDE TAB 400 MG (241.3 MG ELEMENTAL MG) 1 MG: 400 (241.3 MG) TAB at 09:06

## 2019-06-14 RX ADMIN — LEVALBUTEROL 1 MG: 1.25 SOLUTION, CONCENTRATE RESPIRATORY (INHALATION) at 16:40

## 2019-06-14 RX ADMIN — IPRATROPIUM BROMIDE 1 MG: 0.5 SOLUTION RESPIRATORY (INHALATION) at 22:16

## 2019-06-14 RX ADMIN — METHYLPREDNISOLONE SODIUM SUCCINATE 1 MG: 125 INJECTION, POWDER, FOR SOLUTION INTRAMUSCULAR; INTRAVENOUS at 01:37

## 2019-06-14 RX ADMIN — AZTREONAM SCH MLS/HR: 1 INJECTION, POWDER, LYOPHILIZED, FOR SOLUTION INTRAMUSCULAR; INTRAVENOUS at 09:53

## 2019-06-14 RX ADMIN — LEVALBUTEROL SCH MG: 1.25 SOLUTION, CONCENTRATE RESPIRATORY (INHALATION) at 01:12

## 2019-06-14 RX ADMIN — IPRATROPIUM BROMIDE 1 MG: 0.5 SOLUTION RESPIRATORY (INHALATION) at 01:12

## 2019-06-14 RX ADMIN — MAGNESIUM OXIDE TAB 400 MG (241.3 MG ELEMENTAL MG) 1 MG: 400 (241.3 MG) TAB at 20:10

## 2019-06-14 RX ADMIN — LEVALBUTEROL SCH MG: 1.25 SOLUTION, CONCENTRATE RESPIRATORY (INHALATION) at 05:47

## 2019-06-14 RX ADMIN — MAGNESIUM OXIDE TAB 400 MG (241.3 MG ELEMENTAL MG) SCH MG: 400 (241.3 MG) TAB at 20:10

## 2019-06-14 RX ADMIN — IPRATROPIUM BROMIDE 1 MG: 0.5 SOLUTION RESPIRATORY (INHALATION) at 12:20

## 2019-06-14 RX ADMIN — GUAIFENESIN AND DEXTROMETHORPHAN 1 ML: 100; 10 SYRUP ORAL at 22:40

## 2019-06-14 RX ADMIN — LEVOFLOXACIN 1 MG: 500 TABLET, FILM COATED ORAL at 13:22

## 2019-06-14 RX ADMIN — FAMOTIDINE SCH MG: 20 TABLET ORAL at 09:06

## 2019-06-14 RX ADMIN — LEVALBUTEROL SCH MG: 1.25 SOLUTION, CONCENTRATE RESPIRATORY (INHALATION) at 16:40

## 2019-06-14 RX ADMIN — AZTREONAM 1 MLS/HR: 1 INJECTION, POWDER, LYOPHILIZED, FOR SOLUTION INTRAMUSCULAR; INTRAVENOUS at 09:53

## 2019-06-14 RX ADMIN — ENOXAPARIN SODIUM 1 MG: 100 INJECTION SUBCUTANEOUS at 09:06

## 2019-06-14 RX ADMIN — ENOXAPARIN SODIUM SCH MG: 100 INJECTION SUBCUTANEOUS at 09:06

## 2019-06-14 RX ADMIN — LEVALBUTEROL 1 MG: 1.25 SOLUTION, CONCENTRATE RESPIRATORY (INHALATION) at 05:47

## 2019-06-14 RX ADMIN — METHYLPREDNISOLONE SODIUM SUCCINATE 1 MG: 125 INJECTION, POWDER, FOR SOLUTION INTRAMUSCULAR; INTRAVENOUS at 17:53

## 2019-06-14 RX ADMIN — MAGNESIUM OXIDE TAB 400 MG (241.3 MG ELEMENTAL MG) SCH MG: 400 (241.3 MG) TAB at 13:22

## 2019-06-14 RX ADMIN — MAGNESIUM OXIDE TAB 400 MG (241.3 MG ELEMENTAL MG) 1 MG: 400 (241.3 MG) TAB at 13:22

## 2019-06-14 RX ADMIN — LEVALBUTEROL SCH MG: 1.25 SOLUTION, CONCENTRATE RESPIRATORY (INHALATION) at 22:16

## 2019-06-14 RX ADMIN — LEVALBUTEROL 1 MG: 1.25 SOLUTION, CONCENTRATE RESPIRATORY (INHALATION) at 08:00

## 2019-06-14 RX ADMIN — LEVALBUTEROL 1 MG: 1.25 SOLUTION, CONCENTRATE RESPIRATORY (INHALATION) at 22:16

## 2019-06-14 RX ADMIN — IPRATROPIUM BROMIDE 1 MG: 0.5 SOLUTION RESPIRATORY (INHALATION) at 16:40

## 2019-06-14 RX ADMIN — IPRATROPIUM BROMIDE 1 MG: 0.5 SOLUTION RESPIRATORY (INHALATION) at 05:47

## 2019-06-14 RX ADMIN — MAGNESIUM OXIDE TAB 400 MG (241.3 MG ELEMENTAL MG) SCH MG: 400 (241.3 MG) TAB at 09:06

## 2019-06-14 RX ADMIN — IPRATROPIUM BROMIDE 1 MG: 0.5 SOLUTION RESPIRATORY (INHALATION) at 08:00

## 2019-06-14 RX ADMIN — VANCOMYCIN HYDROCHLORIDE 1 MLS/HR: 750 INJECTION, POWDER, LYOPHILIZED, FOR SOLUTION INTRAVENOUS at 10:38

## 2019-06-14 RX ADMIN — FAMOTIDINE 1 MG: 20 TABLET ORAL at 09:06

## 2019-06-14 RX ADMIN — LEVALBUTEROL SCH MG: 1.25 SOLUTION, CONCENTRATE RESPIRATORY (INHALATION) at 12:20

## 2019-06-14 NOTE — PN
Date/Time of Note


Date/Time of Note


DATE: 6/14/19 


TIME: 15:52





Assessment/Plan


VTE Prophylaxis


Risk score (from Nsg)>0 risk:  5


SCD applied (from Nsg):  Yes


SCD contraindicated:  low risk/ambulating


Pharmacological prophylaxis:  LMWH





Lines/Catheters


IV Catheter Type (from Nrs):  Saline Lock





Assessment/Plan


Hospital Course





Assessment and plan


1.  Dyspnea, fever, bronchitis vs pneumonia. cont Antibiotics, follow-up on 


cultures


2.  History of tuberculosis.  Treated in [place] about 10 years ago.


3.  Weight loss 5 to 8 pounds.


4.  COPD?


5.  Acute hypoxic respiratory failure, stable continue antibiotics





S: 


6/13 dyspnea low-grade fever.  Mild/moderate weight loss only.  States she was 


treated many years ago for tuberculosis.  She started medical therapy in 


[place] and finished treatment in [place].  Denies any ill contacts.  Did 


recently travel here from [place].  No edema chest pain.  Diet has been okay.  


[place] and [place].


6/14 patient states she feels a little better.  No fever.





O: vss





PE


No pallor


Reg no mrg


wheezing bilaterally


Bs  nt nd no RRG


No edema or Homans


Result Diagram:  


6/14/19 0500                                                                    


           6/14/19 0438





Results 24hrs





Laboratory Tests


       Test
                                6/14/19
04:38  6/14/19
05:00


       Sodium Level                                 140


       Potassium Level                              3.9


       Chloride Level                               108


       Carbon Dioxide Level                          28


       Anion Gap                                     4  L


       Blood Urea Nitrogen                           17


       Creatinine                                  0.60


       Est Glomerular Filtrat Rate
mL/min     
            



       Glucose Level                                150


       Calcium Level                                9.4


       Phosphorus Level                            2.4  L


       Magnesium Level                              2.3


       Total Bilirubin                              0.6


       Direct Bilirubin                            0.00


       Indirect Bilirubin                           0.6


       Aspartate Amino Transf
(AST/SGOT)            40  
  



       Alanine Aminotransferase
(ALT/SGPT)          23  
  



       Alkaline Phosphatase                          98


       Troponin I                                 0.017


       Total Protein                                6.9


       Albumin                                      3.8


       Globulin                                    3.10


       Albumin/Globulin Ratio                      1.22


       Thyroid Stimulating Hormone
(TSH)         1.090  
  



       White Blood Count                                         18.1  H


       Red Blood Count                                            4.24


       Hemoglobin                                                 12.8


       Hematocrit                                                 38.7


       Mean Corpuscular Volume                                    91.3


       Mean Corpuscular Hemoglobin                                30.2


       Mean Corpuscular Hemoglobin
Concent  
                    33.1  



       Red Cell Distribution Width                                13.7


       Platelet Count                                              247


       Mean Platelet Volume                                        9.9


       Immature Granulocytes %                                   0.400


       Neutrophils %                                             93.5  H


       Lymphocytes %                                              4.2  L


       Monocytes %                                                 1.8


       Eosinophils %                                               0.0


       Basophils %                                                 0.1


       Nucleated Red Blood Cells %                                 0.0


       Immature Granulocytes #                                  0.080  H


       Neutrophils #                                             16.9  H


       Lymphocytes #                                               0.8


       Monocytes #                                                 0.3


       Eosinophils #                                               0.0


       Basophils #                                                 0.0


       Nucleated Red Blood Cells #                                 0.0








Exam/Review of Systems


Exam


Vitals





Vital Signs


  Date      Temp  Pulse  Resp  B/P (MAP)   Pulse Ox  O2          O2 Flow    FiO2


Time                                                 Delivery    Rate


   6/14/19  97.9    100    20      125/56        97  Nasal             2.0


     14:17                           (79)            Cannula


   6/14/19                                                                    21


     01:13








Intake and Output





6/13/19 6/13/19 6/14/19





1515:00


23:00


07:00





IntakeIntake Total


350 ml





BalanceBalance


350 ml














Results


Results 24hrs





Laboratory Tests


       Test
                                6/14/19
04:38  6/14/19
05:00


       Sodium Level                                 140


       Potassium Level                              3.9


       Chloride Level                               108


       Carbon Dioxide Level                          28


       Anion Gap                                     4  L


       Blood Urea Nitrogen                           17


       Creatinine                                  0.60


       Est Glomerular Filtrat Rate
mL/min     
            



       Glucose Level                                150


       Calcium Level                                9.4


       Phosphorus Level                            2.4  L


       Magnesium Level                              2.3


       Total Bilirubin                              0.6


       Direct Bilirubin                            0.00


       Indirect Bilirubin                           0.6


       Aspartate Amino Transf
(AST/SGOT)            40  
  



       Alanine Aminotransferase
(ALT/SGPT)          23  
  



       Alkaline Phosphatase                          98


       Troponin I                                 0.017


       Total Protein                                6.9


       Albumin                                      3.8


       Globulin                                    3.10


       Albumin/Globulin Ratio                      1.22


       Thyroid Stimulating Hormone
(TSH)         1.090  
  



       White Blood Count                                         18.1  H


       Red Blood Count                                            4.24


       Hemoglobin                                                 12.8


       Hematocrit                                                 38.7


       Mean Corpuscular Volume                                    91.3


       Mean Corpuscular Hemoglobin                                30.2


       Mean Corpuscular Hemoglobin
Concent  
                    33.1  



       Red Cell Distribution Width                                13.7


       Platelet Count                                              247


       Mean Platelet Volume                                        9.9


       Immature Granulocytes %                                   0.400


       Neutrophils %                                             93.5  H


       Lymphocytes %                                              4.2  L


       Monocytes %                                                 1.8


       Eosinophils %                                               0.0


       Basophils %                                                 0.1


       Nucleated Red Blood Cells %                                 0.0


       Immature Granulocytes #                                  0.080  H


       Neutrophils #                                             16.9  H


       Lymphocytes #                                               0.8


       Monocytes #                                                 0.3


       Eosinophils #                                               0.0


       Basophils #                                                 0.0


       Nucleated Red Blood Cells #                                 0.0








Medications


Medication





Current Medications


Levalbuterol (Xopenex Neb) 1.25 mg Q4H RESP  THERAPY HHN  Last administered on 


6/14/19at 12:20; Admin Dose 1.25 MG;  Start 6/13/19 at 05:00


Ipratropium Bromide (Atrovent 0.02% (Neb)) 0.5 mg Q4H RESP  THERAPY HHN  Last 


administered on 6/14/19at 12:20; Admin Dose 0.5 MG;  Start 6/13/19 at 05:00


IV Flush (NS 3 ml) 3 ml PER PROTOCOL IV ;  Start 6/13/19 at 03:00


Ondansetron HCl (Zofran Inj) 4 mg Q6H  PRN IV NAUSEA/VOMITING;  Start 6/13/19 at


03:00


Acetaminophen (Tylenol Tab) 650 mg Q6H  PRN PO .PAIN 1-3 OR TEMP;  Start 6/13/19


at 03:00


Docusate Sodium (Colace) 100 mg Q12H  PRN PO .CONSTIPATION;  Start 6/13/19 at 


03:00


Bisacodyl (Dulcolax) 5 mg DAILY  PRN PO .CONSTIPATION;  Start 6/13/19 at 03:00


Famotidine (Pepcid) 20 mg DAILY PO  Last administered on 6/14/19at 09:06; Admin 


Dose 20 MG;  Start 6/14/19 at 09:00


Methylprednisolone Sodium Succinate (Solu-Medrol) 90 mg Q8H IV  Last 


administered on 6/14/19at 09:08; Admin Dose 90 MG;  Start 6/13/19 at 17:30


Guaifenesin/ Dextromethorphan (Robitussin Dm Liquid Cup) 10 ml Q4  PRN PO COUGH 


Last administered on 6/14/19at 15:16; Admin Dose 10 ML;  Start 6/13/19 at 17:30


Magnesium Oxide (Mag-Ox 400) 400 mg TID PO  Last administered on 6/14/19at 


13:22; Admin Dose 400 MG;  Start 6/13/19 at 17:30


Enoxaparin Sodium (Lovenox) 40 mg DAILY SC  Last administered on 6/14/19at 


09:06; Admin Dose 40 MG;  Start 6/14/19 at 09:00


Levofloxacin (Levaquin) 250 mg DAILY@06 PO ;  Start 6/15/19 at 06:00











MELL PRADO MD         Jun 14, 2019 15:53

## 2019-06-14 NOTE — CONS
Assessment/Plan


Assessment/Plan


Assessment/Plan (Daily)


Assessment and recommendations;





1.  Patient admitted with acute bronchitis with severe wheezing of very recent 


onset.  No prior symptoms until just a few days ago.


2.  Likely chronic interstitial lung disease from prior either occupational or 


infectious process.  Currently there is no indication to suggest pulmonary 


tuberculosis.


3.  Possibly UTI.





Discontinue vancomycin and Azactam.  Will start Levaquin 500 mg orally daily.  


Further antibiotic adjustment to be done once urine culture results are 


obtained.  Meanwhile continue Solu-Medrol as well as broncho-dilator regimen.


I did have a detailed discussion with the patient's daughter at bedside and 


answered all her questions.





Consultation Date/Type/Reason


Admit Date/Time





Date of Consultation:  Jun 14, 2019


Type of Consult


Pulmonary





Patient is a very pleasant 86-year-old lady who just came in from [place] few 


days ago and after she got here the patient started having chest congestion and 


wheezing and low-grade fever.  According to her she was absolutely fine prior to


the onset of symptoms just a few days ago.  Patient has been started on 


appropriate bronchodilator as well as systemic steroid regimen.  Patient is 


reporting some improvement in symptoms.  Denies any further fever, any chest 


pain, sore throat, or any other constitutional symptoms.  Denies any chronic 


respiratory symptoms.





Past medical history; unremarkable.  


Medications; reviewed.





Allergies; penicillin.


Social history; never smoked.


Family history; noncontributory.  Patient does have a supportive family.


Occupational history; patient used to work in the fields.


Review of systems; denies any headache, visual changes, sinus symptoms.  Any 


chest pain, angina, complains of scant cough without any sputum production or 


hemoptysis.  Complains of wheezing.  Denies any chronic respiratory symptoms.  


Denies any abdominal pain, nausea, vomiting.  Any melena hematochezia.  Any 


urinary symptoms.  Any edema.  Any skin changes.  Any arthritis symptoms.  Any 


weight loss.  Any night sweats or chills.


General exam; elderly woman, awake alert, currently in no distress.


Date/Time of Note


DATE: 6/14/19 


TIME: 12:43





Past Medical History


Medical History:  no pertinent history


Home Meds


Reported Medications


[Ultibro]   No Conflict Check, INH DAILY PRN for SHORTNESS OF BREATH


   6/13/19


Phenylephrine/Diphenhydramine (DIMETAPP COLD & CONGEST LIQUID) 118 Ml Liquid, 


118 ML PO


   6/13/19


Medications





Current Medications


Levalbuterol (Xopenex Neb) 1.25 mg Q4H RESP  THERAPY HHN  Last administered on 


6/14/19at 12:20; Admin Dose 1.25 MG;  Start 6/13/19 at 05:00


Ipratropium Bromide (Atrovent 0.02% (Neb)) 0.5 mg Q4H RESP  THERAPY HHN  Last 


administered on 6/14/19at 12:20; Admin Dose 0.5 MG;  Start 6/13/19 at 05:00


IV Flush (NS 3 ml) 3 ml PER PROTOCOL IV ;  Start 6/13/19 at 03:00


Ondansetron HCl (Zofran Inj) 4 mg Q6H  PRN IV NAUSEA/VOMITING;  Start 6/13/19 at


 03:00


Acetaminophen (Tylenol Tab) 650 mg Q6H  PRN PO .PAIN 1-3 OR TEMP;  Start 6/13/19


 at 03:00


Docusate Sodium (Colace) 100 mg Q12H  PRN PO .CONSTIPATION;  Start 6/13/19 at 


03:00


Bisacodyl (Dulcolax) 5 mg DAILY  PRN PO .CONSTIPATION;  Start 6/13/19 at 03:00


Aztreonam 50 ml @  100 mls/hr Q12 IVPB  Last administered on 6/14/19at 09:53; 


Admin Dose 100 MLS/HR;  Start 6/13/19 at 09:00


Vancomycin HCl (Vanco Iv Per Pharmacy) VANCOMYCIN PER PHARMACY PER PROTOCOL XX ;


  Start 6/13/19 at 07:30


Vancomycin/Sodium Chloride 250 ml @  125 mls/hr Q24H IVPB  Last administered on 


6/14/19at 10:38; Admin Dose 125 MLS/HR;  Start 6/14/19 at 10:00


Famotidine (Pepcid) 20 mg DAILY PO  Last administered on 6/14/19at 09:06; Admin 


Dose 20 MG;  Start 6/14/19 at 09:00


Methylprednisolone Sodium Succinate (Solu-Medrol) 90 mg Q8H IV  Last 


administered on 6/14/19at 09:08; Admin Dose 90 MG;  Start 6/13/19 at 17:30


Guaifenesin/ Dextromethorphan (Robitussin Dm Liquid Cup) 10 ml Q4  PRN PO COUGH;


  Start 6/13/19 at 17:30


Magnesium Oxide (Mag-Ox 400) 400 mg TID PO  Last administered on 6/14/19at 


09:06; Admin Dose 400 MG;  Start 6/13/19 at 17:30


Enoxaparin Sodium (Lovenox) 40 mg DAILY SC  Last administered on 6/14/19at 


09:06; Admin Dose 40 MG;  Start 6/14/19 at 09:00


Allergies:  


Coded Allergies:  


     Penicillins (Verified  Allergy, Unknown, 6/13/19)





Past Surgical History


Past Surgical Hx:  no surgical history





Social History


Alcohol Use:  none


Smoking Status:  Never smoker


Drug Use:  none





Exam/Review of Systems


Exam


Vitals





Vital Signs


  Date      Temp  Pulse  Resp  B/P (MAP)   Pulse Ox  O2          O2 Flow    FiO2


Time                                                 Delivery    Rate


   6/14/19          101    22                    97  Nasal             2.0


     12:20                                           Cannula


   6/14/19  98.3                   133/57


     08:10                           (82)


   6/14/19                                                                    21


     01:13








Intake and Output





6/13/19 6/13/19 6/14/19





1515:00


23:00


07:00





IntakeIntake Total


350 ml





BalanceBalance


350 ml











Exam


H EENT exam; supple neck, no JVD.  No lymphadenopathy.  Midline trachea.  No 


thyromegaly.  No neck masses.  Pupils are small bilaterally.  Patient has 


dentures in place.


Chest exam; diffuse bilateral wheezing.  S1-S2 audible, no murmurs.  Regular 


rhythm.


Abdomen exam; soft, nontender.  No organomegaly.  Bowel sounds audible.


Extremity exam; no peripheral edema clubbing.


CNS exam; no focal deficit.





Results


Result Diagram:  


6/14/19 0500                                                                    


            6/14/19 0438





Results 24hrs





Laboratory Tests


       Test
                                6/14/19
04:38  6/14/19
05:00


       Sodium Level                                 140


       Potassium Level                              3.9


       Chloride Level                               108


       Carbon Dioxide Level                          28


       Anion Gap                                     4  L


       Blood Urea Nitrogen                           17


       Creatinine                                  0.60


       Est Glomerular Filtrat Rate
mL/min     
            



       Glucose Level                                150


       Calcium Level                                9.4


       Phosphorus Level                            2.4  L


       Magnesium Level                              2.3


       Total Bilirubin                              0.6


       Direct Bilirubin                            0.00


       Indirect Bilirubin                           0.6


       Aspartate Amino Transf
(AST/SGOT)            40  
  



       Alanine Aminotransferase
(ALT/SGPT)          23  
  



       Alkaline Phosphatase                          98


       Troponin I                                 0.017


       Total Protein                                6.9


       Albumin                                      3.8


       Globulin                                    3.10


       Albumin/Globulin Ratio                      1.22


       Thyroid Stimulating Hormone
(TSH)         1.090  
  



       White Blood Count                                         18.1  H


       Red Blood Count                                            4.24


       Hemoglobin                                                 12.8


       Hematocrit                                                 38.7


       Mean Corpuscular Volume                                    91.3


       Mean Corpuscular Hemoglobin                                30.2


       Mean Corpuscular Hemoglobin
Concent  
                    33.1  



       Red Cell Distribution Width                                13.7


       Platelet Count                                              247


       Mean Platelet Volume                                        9.9


       Immature Granulocytes %                                   0.400


       Neutrophils %                                             93.5  H


       Lymphocytes %                                              4.2  L


       Monocytes %                                                 1.8


       Eosinophils %                                               0.0


       Basophils %                                                 0.1


       Nucleated Red Blood Cells %                                 0.0


       Immature Granulocytes #                                  0.080  H


       Neutrophils #                                             16.9  H


       Lymphocytes #                                               0.8


       Monocytes #                                                 0.3


       Eosinophils #                                               0.0


       Basophils #                                                 0.0


       Nucleated Red Blood Cells #                                 0.0








Medications


Medication





Current Medications


Levalbuterol (Xopenex Neb) 1.25 mg Q4H RESP  THERAPY HHN  Last administered on 


6/14/19at 12:20; Admin Dose 1.25 MG;  Start 6/13/19 at 05:00


Ipratropium Bromide (Atrovent 0.02% (Neb)) 0.5 mg Q4H RESP  THERAPY HHN  Last 


administered on 6/14/19at 12:20; Admin Dose 0.5 MG;  Start 6/13/19 at 05:00


IV Flush (NS 3 ml) 3 ml PER PROTOCOL IV ;  Start 6/13/19 at 03:00


Ondansetron HCl (Zofran Inj) 4 mg Q6H  PRN IV NAUSEA/VOMITING;  Start 6/13/19 at


 03:00


Acetaminophen (Tylenol Tab) 650 mg Q6H  PRN PO .PAIN 1-3 OR TEMP;  Start 6/13/19


 at 03:00


Docusate Sodium (Colace) 100 mg Q12H  PRN PO .CONSTIPATION;  Start 6/13/19 at 


03:00


Bisacodyl (Dulcolax) 5 mg DAILY  PRN PO .CONSTIPATION;  Start 6/13/19 at 03:00


Aztreonam 50 ml @  100 mls/hr Q12 IVPB  Last administered on 6/14/19at 09:53; 


Admin Dose 100 MLS/HR;  Start 6/13/19 at 09:00


Vancomycin HCl (Vanco Iv Per Pharmacy) VANCOMYCIN PER PHARMACY PER PROTOCOL XX ;


  Start 6/13/19 at 07:30


Vancomycin/Sodium Chloride 250 ml @  125 mls/hr Q24H IVPB  Last administered on 


6/14/19at 10:38; Admin Dose 125 MLS/HR;  Start 6/14/19 at 10:00


Famotidine (Pepcid) 20 mg DAILY PO  Last administered on 6/14/19at 09:06; Admin 


Dose 20 MG;  Start 6/14/19 at 09:00


Methylprednisolone Sodium Succinate (Solu-Medrol) 90 mg Q8H IV  Last 


administered on 6/14/19at 09:08; Admin Dose 90 MG;  Start 6/13/19 at 17:30


Guaifenesin/ Dextromethorphan (Robitussin Dm Liquid Cup) 10 ml Q4  PRN PO COUGH;


  Start 6/13/19 at 17:30


Magnesium Oxide (Mag-Ox 400) 400 mg TID PO  Last administered on 6/14/19at 


09:06; Admin Dose 400 MG;  Start 6/13/19 at 17:30


Enoxaparin Sodium (Lovenox) 40 mg DAILY SC  Last administered on 6/14/19at 


09:06; Admin Dose 40 MG;  Start 6/14/19 at 09:00











KULDEEP HURST                    Jun 14, 2019 12:46

## 2019-06-15 VITALS — DIASTOLIC BLOOD PRESSURE: 71 MMHG | HEART RATE: 88 BPM | SYSTOLIC BLOOD PRESSURE: 147 MMHG | RESPIRATION RATE: 16 BRPM

## 2019-06-15 VITALS — HEART RATE: 105 BPM | RESPIRATION RATE: 20 BRPM | DIASTOLIC BLOOD PRESSURE: 72 MMHG | SYSTOLIC BLOOD PRESSURE: 150 MMHG

## 2019-06-15 VITALS — RESPIRATION RATE: 18 BRPM | DIASTOLIC BLOOD PRESSURE: 70 MMHG | SYSTOLIC BLOOD PRESSURE: 146 MMHG | HEART RATE: 87 BPM

## 2019-06-15 VITALS — HEART RATE: 100 BPM | SYSTOLIC BLOOD PRESSURE: 154 MMHG | RESPIRATION RATE: 16 BRPM | DIASTOLIC BLOOD PRESSURE: 72 MMHG

## 2019-06-15 LAB
ADD MAN DIFF?: NO
ALANINE AMINOTRANSFERASE: 30 IU/L (ref 13–69)
ALBUMIN/GLOBULIN RATIO: 1.21
ALBUMIN: 3.9 G/DL (ref 3.3–4.9)
ALKALINE PHOSPHATASE: 91 IU/L (ref 42–121)
ANION GAP: 7 (ref 5–13)
ASPARTATE AMINO TRANSFERASE: 70 IU/L (ref 15–46)
BASOPHIL #: 0 10^3/UL (ref 0–0.1)
BASOPHILS %: 0.1 % (ref 0–2)
BILIRUBIN,DIRECT: 0 MG/DL (ref 0–0.2)
BILIRUBIN,TOTAL: 0.5 MG/DL (ref 0.2–1.3)
BLOOD UREA NITROGEN: 21 MG/DL (ref 7–20)
CALCIUM: 9.2 MG/DL (ref 8.4–10.2)
CARBON DIOXIDE: 27 MMOL/L (ref 21–31)
CHLORIDE: 105 MMOL/L (ref 97–110)
CREATININE: 0.67 MG/DL (ref 0.44–1)
EOSINOPHILS #: 0 10^3/UL (ref 0–0.5)
EOSINOPHILS %: 0 % (ref 0–7)
GLOBULIN: 3.2 G/DL (ref 1.3–3.2)
GLUCOSE: 161 MG/DL (ref 70–220)
HEMATOCRIT: 42.4 % (ref 37–47)
HEMOGLOBIN: 13.7 G/DL (ref 12–16)
IMMATURE GRANS #M: 0.13 10^3/UL (ref 0–0.03)
IMMATURE GRANS % (M): 0.6 % (ref 0–0.43)
LYMPHOCYTES #: 0.7 10^3/UL (ref 0.8–2.9)
LYMPHOCYTES %: 3.4 % (ref 15–51)
MEAN CORPUSCULAR HEMOGLOBIN: 30.1 PG (ref 29–33)
MEAN CORPUSCULAR HGB CONC: 32.3 G/DL (ref 32–37)
MEAN CORPUSCULAR VOLUME: 93.2 FL (ref 82–101)
MEAN PLATELET VOLUME: 9.9 FL (ref 7.4–10.4)
MITOGEN-NIL: 2.15 IU/ML
MONOCYTE #: 0.4 10^3/UL (ref 0.3–0.9)
MONOCYTES %: 1.9 % (ref 0–11)
NEUTROPHIL #: 19.2 10^3/UL (ref 1.6–7.5)
NEUTROPHILS %: 94 % (ref 39–77)
NIL: 0.01 IU/ML
NUCLEATED RED BLOOD CELLS #: 0 10^3/UL (ref 0–0)
NUCLEATED RED BLOOD CELLS%: 0 /100WBC (ref 0–0)
PLATELET COUNT: 289 10^3/UL (ref 140–415)
POTASSIUM: 4.2 MMOL/L (ref 3.5–5.1)
QUANTIFERON(R)-TB GOLD: NEGATIVE
RED BLOOD COUNT: 4.55 10^6/UL (ref 4.2–5.4)
RED CELL DISTRIBUTION WIDTH: 14.1 % (ref 11.5–14.5)
SODIUM: 139 MMOL/L (ref 135–144)
TB-NIL: 0.01 IU/ML
TB2-NIL: 0.02 IU/ML
TOTAL PROTEIN: 7.1 G/DL (ref 6.1–8.1)
WHITE BLOOD COUNT: 20.5 10^3/UL (ref 4.8–10.8)

## 2019-06-15 RX ADMIN — MAGNESIUM OXIDE TAB 400 MG (241.3 MG ELEMENTAL MG) SCH MG: 400 (241.3 MG) TAB at 20:51

## 2019-06-15 RX ADMIN — LEVALBUTEROL SCH MG: 1.25 SOLUTION, CONCENTRATE RESPIRATORY (INHALATION) at 17:21

## 2019-06-15 RX ADMIN — METHYLPREDNISOLONE SODIUM SUCCINATE 1 MG: 125 INJECTION, POWDER, FOR SOLUTION INTRAMUSCULAR; INTRAVENOUS at 01:33

## 2019-06-15 RX ADMIN — ACETAMINOPHEN 1 MG: 325 TABLET, FILM COATED ORAL at 08:59

## 2019-06-15 RX ADMIN — LEVALBUTEROL SCH MG: 1.25 SOLUTION, CONCENTRATE RESPIRATORY (INHALATION) at 02:40

## 2019-06-15 RX ADMIN — LEVALBUTEROL 1 MG: 1.25 SOLUTION, CONCENTRATE RESPIRATORY (INHALATION) at 13:00

## 2019-06-15 RX ADMIN — ENOXAPARIN SODIUM 1 MG: 100 INJECTION SUBCUTANEOUS at 08:52

## 2019-06-15 RX ADMIN — IPRATROPIUM BROMIDE 1 MG: 0.5 SOLUTION RESPIRATORY (INHALATION) at 02:40

## 2019-06-15 RX ADMIN — METHYLPREDNISOLONE SODIUM SUCCINATE 1 MG: 125 INJECTION, POWDER, FOR SOLUTION INTRAMUSCULAR; INTRAVENOUS at 08:50

## 2019-06-15 RX ADMIN — IPRATROPIUM BROMIDE 1 MG: 0.5 SOLUTION RESPIRATORY (INHALATION) at 13:00

## 2019-06-15 RX ADMIN — IPRATROPIUM BROMIDE 1 MG: 0.5 SOLUTION RESPIRATORY (INHALATION) at 17:20

## 2019-06-15 RX ADMIN — LEVALBUTEROL SCH MG: 1.25 SOLUTION, CONCENTRATE RESPIRATORY (INHALATION) at 13:00

## 2019-06-15 RX ADMIN — LEVALBUTEROL 1 MG: 1.25 SOLUTION, CONCENTRATE RESPIRATORY (INHALATION) at 05:00

## 2019-06-15 RX ADMIN — MAGNESIUM OXIDE TAB 400 MG (241.3 MG ELEMENTAL MG) 1 MG: 400 (241.3 MG) TAB at 20:51

## 2019-06-15 RX ADMIN — MONTELUKAST SODIUM 1 MG: 10 TABLET, FILM COATED ORAL at 20:51

## 2019-06-15 RX ADMIN — METHYLPREDNISOLONE SODIUM SUCCINATE 1 MG: 125 INJECTION, POWDER, FOR SOLUTION INTRAMUSCULAR; INTRAVENOUS at 17:36

## 2019-06-15 RX ADMIN — LEVALBUTEROL SCH MG: 1.25 SOLUTION, CONCENTRATE RESPIRATORY (INHALATION) at 21:31

## 2019-06-15 RX ADMIN — LEVALBUTEROL SCH MG: 1.25 SOLUTION, CONCENTRATE RESPIRATORY (INHALATION) at 05:00

## 2019-06-15 RX ADMIN — LEVALBUTEROL SCH MG: 1.25 SOLUTION, CONCENTRATE RESPIRATORY (INHALATION) at 09:00

## 2019-06-15 RX ADMIN — MAGNESIUM OXIDE TAB 400 MG (241.3 MG ELEMENTAL MG) 1 MG: 400 (241.3 MG) TAB at 13:58

## 2019-06-15 RX ADMIN — MAGNESIUM OXIDE TAB 400 MG (241.3 MG ELEMENTAL MG) SCH MG: 400 (241.3 MG) TAB at 08:50

## 2019-06-15 RX ADMIN — ENOXAPARIN SODIUM SCH MG: 100 INJECTION SUBCUTANEOUS at 08:52

## 2019-06-15 RX ADMIN — LEVALBUTEROL 1 MG: 1.25 SOLUTION, CONCENTRATE RESPIRATORY (INHALATION) at 17:21

## 2019-06-15 RX ADMIN — IPRATROPIUM BROMIDE 1 MG: 0.5 SOLUTION RESPIRATORY (INHALATION) at 09:00

## 2019-06-15 RX ADMIN — LEVOFLOXACIN SCH MG: 250 TABLET, FILM COATED ORAL at 05:47

## 2019-06-15 RX ADMIN — LEVALBUTEROL 1 MG: 1.25 SOLUTION, CONCENTRATE RESPIRATORY (INHALATION) at 02:40

## 2019-06-15 RX ADMIN — GUAIFENESIN AND DEXTROMETHORPHAN 1 ML: 100; 10 SYRUP ORAL at 20:51

## 2019-06-15 RX ADMIN — LEVALBUTEROL 1 MG: 1.25 SOLUTION, CONCENTRATE RESPIRATORY (INHALATION) at 21:31

## 2019-06-15 RX ADMIN — MONTELUKAST SODIUM SCH MG: 10 TABLET, FILM COATED ORAL at 20:51

## 2019-06-15 RX ADMIN — MAGNESIUM OXIDE TAB 400 MG (241.3 MG ELEMENTAL MG) SCH MG: 400 (241.3 MG) TAB at 13:58

## 2019-06-15 RX ADMIN — LEVOFLOXACIN 1 MG: 250 TABLET, FILM COATED ORAL at 05:47

## 2019-06-15 RX ADMIN — GUAIFENESIN AND DEXTROMETHORPHAN 1 ML: 100; 10 SYRUP ORAL at 10:45

## 2019-06-15 RX ADMIN — MAGNESIUM OXIDE TAB 400 MG (241.3 MG ELEMENTAL MG) 1 MG: 400 (241.3 MG) TAB at 08:50

## 2019-06-15 RX ADMIN — GUAIFENESIN AND DEXTROMETHORPHAN 1 ML: 100; 10 SYRUP ORAL at 05:52

## 2019-06-15 RX ADMIN — FAMOTIDINE SCH MG: 20 TABLET ORAL at 08:50

## 2019-06-15 RX ADMIN — LEVALBUTEROL 1 MG: 1.25 SOLUTION, CONCENTRATE RESPIRATORY (INHALATION) at 09:00

## 2019-06-15 RX ADMIN — IPRATROPIUM BROMIDE 1 MG: 0.5 SOLUTION RESPIRATORY (INHALATION) at 05:00

## 2019-06-15 RX ADMIN — FAMOTIDINE 1 MG: 20 TABLET ORAL at 08:50

## 2019-06-15 RX ADMIN — IPRATROPIUM BROMIDE 1 MG: 0.5 SOLUTION RESPIRATORY (INHALATION) at 21:31

## 2019-06-15 NOTE — CONS
Consult Date/Type/Reason


Admit Date/Time


Jun 13, 2019 at 02:00


Initial Consult Date


6/14/19


Type of Consultation:  Pulm


Date/Time of Note


DATE: 6/15/19 


TIME: 17:17





Subjective


No events. Slightly better today. CT reviewed.





Objective


Vitals





Vital Signs


  Date      Temp  Pulse  Resp  B/P (MAP)   Pulse Ox  O2          O2 Flow    FiO2


Time                                                 Delivery    Rate


   6/15/19  98.4     88    16      147/71        96  Room Air


     14:03                           (96)


   6/15/19                                                             2.0


     13:29


   6/14/19                                                                    21


     01:13








Intake and Output





6/14/19


6/14/19


6/15/19





1515:00


23:00


07:00





IntakeIntake Total


700 ml


300 ml


300 ml





BalanceBalance


700 ml


300 ml


300 ml











Exam


HEENT: Neck supple; no JVD; no LAD


CVS: RRR, S1 and S2


CHEST: Subtle wheezes upper lung zones 


ABD: Soft, NT, + BS


EXT: No c/c/e





Results/Medications


Result Diagram:  


6/15/19 0632                                                                    


           6/15/19 0632





Results 24 hrs





Laboratory Tests


               Test
                                6/15/19
06:32


               White Blood Count                          20.5  H


               Red Blood Count                             4.55


               Hemoglobin                                  13.7


               Hematocrit                                  42.4


               Mean Corpuscular Volume                     93.2


               Mean Corpuscular Hemoglobin                 30.1


               Mean Corpuscular Hemoglobin
Concent        32.3  



               Red Cell Distribution Width                 14.1


               Platelet Count                               289


               Mean Platelet Volume                         9.9


               Immature Granulocytes %                   0.600  H


               Neutrophils %                              94.0  H


               Lymphocytes %                               3.4  L


               Monocytes %                                  1.9


               Eosinophils %                                0.0


               Basophils %                                  0.1


               Nucleated Red Blood Cells %                  0.0


               Immature Granulocytes #                   0.130  H


               Neutrophils #                              19.2  H


               Lymphocytes #                               0.7  L


               Monocytes #                                  0.4


               Eosinophils #                                0.0


               Basophils #                                  0.0


               Nucleated Red Blood Cells #                  0.0


               Sodium Level                                 139


               Potassium Level                              4.2


               Chloride Level                               105


               Carbon Dioxide Level                          27


               Anion Gap                                      7


               Blood Urea Nitrogen                          21  H


               Creatinine                                  0.67


               Est Glomerular Filtrat Rate
mL/min     



               Glucose Level                                161


               Calcium Level                                9.2


               Total Bilirubin                              0.5


               Direct Bilirubin                            0.00


               Indirect Bilirubin                           0.5


               Aspartate Amino Transf
(AST/SGOT)           70  H



               Alanine Aminotransferase
(ALT/SGPT)          30  



               Alkaline Phosphatase                          91


               Total Protein                                7.1


               Albumin                                      3.9


               Globulin                                    3.20


               Albumin/Globulin Ratio                      1.21





Home Meds


Reported Medications


[Ultibro]   No Conflict Check, INH DAILY PRN for SHORTNESS OF BREATH


   6/13/19


Phenylephrine/Diphenhydramine (DIMETAPP COLD & CONGEST LIQUID) 118 Ml Liquid, 


118 ML PO


   6/13/19


Medications





Current Medications


Levalbuterol (Xopenex Neb) 1.25 mg Q4H RESP  THERAPY HHN  Last administered on 


6/15/19at 02:40; Admin Dose 1.25 MG;  Start 6/13/19 at 05:00


Ipratropium Bromide (Atrovent 0.02% (Neb)) 0.5 mg Q4H RESP  THERAPY HHN  Last 


administered on 6/15/19at 02:40; Admin Dose 0.5 MG;  Start 6/13/19 at 05:00


IV Flush (NS 3 ml) 3 ml PER PROTOCOL IV ;  Start 6/13/19 at 03:00


Ondansetron HCl (Zofran Inj) 4 mg Q6H  PRN IV NAUSEA/VOMITING;  Start 6/13/19 at


 03:00


Acetaminophen (Tylenol Tab) 650 mg Q6H  PRN PO .PAIN 1-3 OR TEMP Last 


administered on 6/15/19at 08:59; Admin Dose 650 MG;  Start 6/13/19 at 03:00


Docusate Sodium (Colace) 100 mg Q12H  PRN PO .CONSTIPATION;  Start 6/13/19 at 


03:00


Bisacodyl (Dulcolax) 5 mg DAILY  PRN PO .CONSTIPATION;  Start 6/13/19 at 03:00


Famotidine (Pepcid) 20 mg DAILY PO  Last administered on 6/15/19at 08:50; Admin 


Dose 20 MG;  Start 6/14/19 at 09:00


Guaifenesin/ Dextromethorphan (Robitussin Dm Liquid Cup) 10 ml Q4  PRN PO COUGH 


Last administered on 6/15/19at 10:45; Admin Dose 10 ML;  Start 6/13/19 at 17:30


Magnesium Oxide (Mag-Ox 400) 400 mg TID PO  Last administered on 6/15/19at 


13:58; Admin Dose 400 MG;  Start 6/13/19 at 17:30


Enoxaparin Sodium (Lovenox) 40 mg DAILY SC  Last administered on 6/15/19at 


08:52; Admin Dose 40 MG;  Start 6/14/19 at 09:00


Levofloxacin (Levaquin) 250 mg DAILY@06 PO  Last administered on 6/15/19at 


05:47; Admin Dose 250 MG;  Start 6/15/19 at 06:00;  Stop 6/22/19 at 05:59


Methylprednisolone Sodium Succinate (Solu-Medrol) 30 mg Q8H IV ;  Start 6/15/19 


at 17:30


Tiotropium Bromide (Spiriva) 1 inh DAILY INH ;  Start 6/15/19 at 11:00


Fluticasone/ Vilanterol (Breo Ellipta 100-25 Mcg Inh) 1 inh DAILY INH ;  Start 


6/15/19 at 11:00


Montelukast Sodium (Singulair) 10 mg HS PO ;  Start 6/15/19 at 21:00





Assessment/Plan


Assessment/Plan (Daily)


IMP:


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 Other 


considerations would include diffuse particulate lung disease ([place] [facility]) and 


pleuropulmonary fibroelastosis. 





RECS:


1. Continue BDs


2. Agree with sputa for AFB x 3 though findings simply suggestive of old TB


3. Taper CS











KELI CONCEPCION MD              Dominick 15, 2019 17:21

## 2019-06-15 NOTE — PN
Date/Time of Note


Date/Time of Note


DATE: 6/15/19 


TIME: 10:37





Assessment/Plan


VTE Prophylaxis


Risk score (from Ns)>0 risk:  5


SCD applied (from [facility]):  Yes


SCD contraindicated:  low risk/ambulating


Pharmacological prophylaxis:  heparin


Pharm contraindication:  low risk/ambulating





Lines/Catheters


IV Catheter Type (from [facility]):  Saline Lock





Assessment/Plan


Problems:  


(1) Urinary tract infection due to Proteus


Onset Date:  ~ 6/13/2019      Status:  Acute


Comment:  Patient is on an antibiotic that we know this organism is sensitive 


to.  Continue treatment standard course.  Please note this will also cover any 


type of a bronchitic lung infection





(2) COPD (chronic obstructive pulmonary disease)


Status:  Acute


Comment:  This patient actually has significant fibrotic lung disease on the 


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  


This was roughly from a decade ago as near as the information in the chart and 


from what I can glean from the family.  The sputum AFBs are being done now.  


However we will go ahead and treat standard lung disease protocol which should 


improve her breathing


Qualifiers:  


   COPD type:  chronic bronchitis  Chronic bronchitis type:  mixed simple and 


mucopurulent  Qualified Codes:  J41.8 - Mixed simple and mucopurulent chronic 


bronchitis


(3) History of tuberculosis


Status:  Chronic


Comment:  Repeat sputum AFBs are in process, note if this patient had active 


tuberculosis her sedimentation rate would have been skyhigh which would have 


been a possible tool to help guide us.  However she has been on steroids.  I 


will go ahead and check the sedimentation rate as it should have decreased that 


quickly even with steroids if this was active tuberculosis





Result Diagram:  


6/15/19 0632                                                                    


           6/15/19 0632





Results 24hrs





Laboratory Tests


               Test
                                6/15/19
06:32


               White Blood Count                          20.5  H


               Red Blood Count                             4.55


               Hemoglobin                                  13.7


               Hematocrit                                  42.4


               Mean Corpuscular Volume                     93.2


               Mean Corpuscular Hemoglobin                 30.1


               Mean Corpuscular Hemoglobin
Concent        32.3  



               Red Cell Distribution Width                 14.1


               Platelet Count                               289


               Mean Platelet Volume                         9.9


               Immature Granulocytes %                   0.600  H


               Neutrophils %                              94.0  H


               Lymphocytes %                               3.4  L


               Monocytes %                                  1.9


               Eosinophils %                                0.0


               Basophils %                                  0.1


               Nucleated Red Blood Cells %                  0.0


               Immature Granulocytes #                   0.130  H


               Neutrophils #                              19.2  H


               Lymphocytes #                               0.7  L


               Monocytes #                                  0.4


               Eosinophils #                                0.0


               Basophils #                                  0.0


               Nucleated Red Blood Cells #                  0.0


               Sodium Level                                 139


               Potassium Level                              4.2


               Chloride Level                               105


               Carbon Dioxide Level                          27


               Anion Gap                                      7


               Blood Urea Nitrogen                          21  H


               Creatinine                                  0.67


               Est Glomerular Filtrat Rate
mL/min     



               Glucose Level                                161


               Calcium Level                                9.2


               Total Bilirubin                              0.5


               Direct Bilirubin                            0.00


               Indirect Bilirubin                           0.5


               Aspartate Amino Transf
(AST/SGOT)           70  H



               Alanine Aminotransferase
(ALT/SGPT)          30  



               Alkaline Phosphatase                          91


               Total Protein                                7.1


               Albumin                                      3.9


               Globulin                                    3.20


               Albumin/Globulin Ratio                      1.21








Subjective


24 Hr Interval Summary


Free Text/Dictation


Patient reports that the nasal cannula is somewhat irritating makes it difficult


to sleep.  She reports her breathing is doing okay.  Please note she is stoic


Constitutional:  no complaints (Denies fevers chills or sweats)


Respiratory:  cough, shortness of breath


Cardiovascular:  no complaints


Gastrointestinal:  no complaints


Genitourinary:  no complaints


Musculoskeletal:  no complaints





Exam/Review of Systems


Exam


Vitals





Vital Signs


  Date      Temp  Pulse  Resp  B/P (MAP)   Pulse Ox  O2          O2 Flow    FiO2


Time                                                 Delivery    Rate


   6/15/19  98.1    100    16      154/72        97  Room Air


     07:18                           (99)


   6/15/19                                                             2.0


     02:45


   6/14/19                                                                    21


     01:13








Intake and Output





6/14/19


6/14/19


6/15/19





1515:00


23:00


07:00





IntakeIntake Total


700 ml


300 ml


300 ml





BalanceBalance


700 ml


300 ml


300 ml











Constitutional:  alert, oriented


Neck:  supple, non-tender


Respiratory:  crackles/rales, wheezing


Cardiovascular:  regular rate and rhythm, nl pulses


Gastrointestinal:  soft, nl liver, spleen, non-tender





Results


Results 24hrs





Laboratory Tests


               Test
                                6/15/19
06:32


               White Blood Count                          20.5  H


               Red Blood Count                             4.55


               Hemoglobin                                  13.7


               Hematocrit                                  42.4


               Mean Corpuscular Volume                     93.2


               Mean Corpuscular Hemoglobin                 30.1


               Mean Corpuscular Hemoglobin
Concent        32.3  



               Red Cell Distribution Width                 14.1


               Platelet Count                               289


               Mean Platelet Volume                         9.9


               Immature Granulocytes %                   0.600  H


               Neutrophils %                              94.0  H


               Lymphocytes %                               3.4  L


               Monocytes %                                  1.9


               Eosinophils %                                0.0


               Basophils %                                  0.1


               Nucleated Red Blood Cells %                  0.0


               Immature Granulocytes #                   0.130  H


               Neutrophils #                              19.2  H


               Lymphocytes #                               0.7  L


               Monocytes #                                  0.4


               Eosinophils #                                0.0


               Basophils #                                  0.0


               Nucleated Red Blood Cells #                  0.0


               Sodium Level                                 139


               Potassium Level                              4.2


               Chloride Level                               105


               Carbon Dioxide Level                          27


               Anion Gap                                      7


               Blood Urea Nitrogen                          21  H


               Creatinine                                  0.67


               Est Glomerular Filtrat Rate
mL/min     



               Glucose Level                                161


               Calcium Level                                9.2


               Total Bilirubin                              0.5


               Direct Bilirubin                            0.00


               Indirect Bilirubin                           0.5


               Aspartate Amino Transf
(AST/SGOT)           70  H



               Alanine Aminotransferase
(ALT/SGPT)          30  



               Alkaline Phosphatase                          91


               Total Protein                                7.1


               Albumin                                      3.9


               Globulin                                    3.20


               Albumin/Globulin Ratio                      1.21








Medications


Medication





Current Medications


Levalbuterol (Xopenex Neb) 1.25 mg Q4H RESP  THERAPY HHN  Last administered on 


6/15/19at 02:40; Admin Dose 1.25 MG;  Start 6/13/19 at 05:00


Ipratropium Bromide (Atrovent 0.02% (Neb)) 0.5 mg Q4H RESP  THERAPY HHN  Last 


administered on 6/15/19at 02:40; Admin Dose 0.5 MG;  Start 6/13/19 at 05:00


IV Flush (NS 3 ml) 3 ml PER PROTOCOL IV ;  Start 6/13/19 at 03:00


Ondansetron HCl (Zofran Inj) 4 mg Q6H  PRN IV NAUSEA/VOMITING;  Start 6/13/19 at


03:00


Acetaminophen (Tylenol Tab) 650 mg Q6H  PRN PO .PAIN 1-3 OR TEMP Last 


administered on 6/15/19at 08:59; Admin Dose 650 MG;  Start 6/13/19 at 03:00


Docusate Sodium (Colace) 100 mg Q12H  PRN PO .CONSTIPATION;  Start 6/13/19 at 


03:00


Bisacodyl (Dulcolax) 5 mg DAILY  PRN PO .CONSTIPATION;  Start 6/13/19 at 03:00


Famotidine (Pepcid) 20 mg DAILY PO  Last administered on 6/15/19at 08:50; Admin 


Dose 20 MG;  Start 6/14/19 at 09:00


Methylprednisolone Sodium Succinate (Solu-Medrol) 90 mg Q8H IV  Last 


administered on 6/15/19at 08:50; Admin Dose 90 MG;  Start 6/13/19 at 17:30


Guaifenesin/ Dextromethorphan (Robitussin Dm Liquid Cup) 10 ml Q4  PRN PO COUGH 


Last administered on 6/15/19at 05:52; Admin Dose 10 ML;  Start 6/13/19 at 17:30


Magnesium Oxide (Mag-Ox 400) 400 mg TID PO  Last administered on 6/15/19at 


08:50; Admin Dose 400 MG;  Start 6/13/19 at 17:30


Enoxaparin Sodium (Lovenox) 40 mg DAILY SC  Last administered on 6/15/19at 


08:52; Admin Dose 40 MG;  Start 6/14/19 at 09:00


Levofloxacin (Levaquin) 250 mg DAILY@06 PO  Last administered on 6/15/19at 


05:47; Admin Dose 250 MG;  Start 6/15/19 at 06:00











ALEXIA ROJAS MD              Dominick 15, 2019 10:41

## 2019-06-16 VITALS — DIASTOLIC BLOOD PRESSURE: 60 MMHG | RESPIRATION RATE: 18 BRPM | HEART RATE: 86 BPM | SYSTOLIC BLOOD PRESSURE: 111 MMHG

## 2019-06-16 VITALS — HEART RATE: 83 BPM | SYSTOLIC BLOOD PRESSURE: 136 MMHG | RESPIRATION RATE: 18 BRPM | DIASTOLIC BLOOD PRESSURE: 69 MMHG

## 2019-06-16 VITALS — SYSTOLIC BLOOD PRESSURE: 139 MMHG | DIASTOLIC BLOOD PRESSURE: 66 MMHG | RESPIRATION RATE: 18 BRPM | HEART RATE: 109 BPM

## 2019-06-16 VITALS — HEART RATE: 88 BPM | RESPIRATION RATE: 16 BRPM | DIASTOLIC BLOOD PRESSURE: 70 MMHG | SYSTOLIC BLOOD PRESSURE: 135 MMHG

## 2019-06-16 LAB
ADD MAN DIFF?: NO
ALANINE AMINOTRANSFERASE: 47 IU/L (ref 13–69)
ALBUMIN/GLOBULIN RATIO: 1.03
ALBUMIN: 3.1 G/DL (ref 3.3–4.9)
ALKALINE PHOSPHATASE: 75 IU/L (ref 42–121)
ANION GAP: 2 (ref 5–13)
ASPARTATE AMINO TRANSFERASE: 55 IU/L (ref 15–46)
BASOPHIL #: 0 10^3/UL (ref 0–0.1)
BASOPHILS %: 0.1 % (ref 0–2)
BILIRUBIN,DIRECT: 0 MG/DL (ref 0–0.2)
BILIRUBIN,TOTAL: 0.5 MG/DL (ref 0.2–1.3)
BLOOD UREA NITROGEN: 21 MG/DL (ref 7–20)
C-REACTIVE PROTEIN: 1.1 MG/DL (ref 0–0.9)
CALCIUM: 8.4 MG/DL (ref 8.4–10.2)
CARBON DIOXIDE: 32 MMOL/L (ref 21–31)
CHLORIDE: 104 MMOL/L (ref 97–110)
CREATININE: 0.6 MG/DL (ref 0.44–1)
EOSINOPHILS #: 0 10^3/UL (ref 0–0.5)
EOSINOPHILS %: 0 % (ref 0–7)
ERYTHROCYTE SEDIMENTATION RATE: 9 MM/HR (ref 0–30)
GLOBULIN: 3 G/DL (ref 1.3–3.2)
GLUCOSE: 150 MG/DL (ref 70–220)
HEMATOCRIT: 37.4 % (ref 37–47)
HEMOGLOBIN: 12 G/DL (ref 12–16)
IMMATURE GRANS #M: 0.07 10^3/UL (ref 0–0.03)
IMMATURE GRANS % (M): 0.5 % (ref 0–0.43)
LYMPHOCYTES #: 0.6 10^3/UL (ref 0.8–2.9)
LYMPHOCYTES %: 4.2 % (ref 15–51)
MEAN CORPUSCULAR HEMOGLOBIN: 29.8 PG (ref 29–33)
MEAN CORPUSCULAR HGB CONC: 32.1 G/DL (ref 32–37)
MEAN CORPUSCULAR VOLUME: 92.8 FL (ref 82–101)
MEAN PLATELET VOLUME: 10.2 FL (ref 7.4–10.4)
MONOCYTE #: 0.5 10^3/UL (ref 0.3–0.9)
MONOCYTES %: 3.2 % (ref 0–11)
NEUTROPHIL #: 13.1 10^3/UL (ref 1.6–7.5)
NEUTROPHILS %: 92 % (ref 39–77)
NUCLEATED RED BLOOD CELLS #: 0 10^3/UL (ref 0–0)
NUCLEATED RED BLOOD CELLS%: 0 /100WBC (ref 0–0)
PLATELET COUNT: 268 10^3/UL (ref 140–415)
POTASSIUM: 4.2 MMOL/L (ref 3.5–5.1)
RED BLOOD COUNT: 4.03 10^6/UL (ref 4.2–5.4)
RED CELL DISTRIBUTION WIDTH: 13.8 % (ref 11.5–14.5)
SODIUM: 138 MMOL/L (ref 135–144)
TOTAL PROTEIN: 6.1 G/DL (ref 6.1–8.1)
WHITE BLOOD COUNT: 14.2 10^3/UL (ref 4.8–10.8)

## 2019-06-16 RX ADMIN — LEVALBUTEROL SCH MG: 1.25 SOLUTION, CONCENTRATE RESPIRATORY (INHALATION) at 09:15

## 2019-06-16 RX ADMIN — FAMOTIDINE SCH MG: 20 TABLET ORAL at 08:52

## 2019-06-16 RX ADMIN — IPRATROPIUM BROMIDE 1 MG: 0.5 SOLUTION RESPIRATORY (INHALATION) at 21:33

## 2019-06-16 RX ADMIN — FLUTICASONE FUROATE AND VILANTEROL TRIFENATATE 1 INH: 100; 25 POWDER RESPIRATORY (INHALATION) at 09:00

## 2019-06-16 RX ADMIN — LEVOFLOXACIN 1 MG: 250 TABLET, FILM COATED ORAL at 05:56

## 2019-06-16 RX ADMIN — LEVALBUTEROL SCH MG: 1.25 SOLUTION, CONCENTRATE RESPIRATORY (INHALATION) at 21:33

## 2019-06-16 RX ADMIN — FLUTICASONE FUROATE AND VILANTEROL TRIFENATATE 1 INH: 100; 25 POWDER RESPIRATORY (INHALATION) at 21:36

## 2019-06-16 RX ADMIN — MAGNESIUM OXIDE TAB 400 MG (241.3 MG ELEMENTAL MG) 1 MG: 400 (241.3 MG) TAB at 21:11

## 2019-06-16 RX ADMIN — LEVALBUTEROL 1 MG: 1.25 SOLUTION, CONCENTRATE RESPIRATORY (INHALATION) at 21:33

## 2019-06-16 RX ADMIN — LEVALBUTEROL SCH MG: 1.25 SOLUTION, CONCENTRATE RESPIRATORY (INHALATION) at 17:08

## 2019-06-16 RX ADMIN — LEVALBUTEROL 1 MG: 1.25 SOLUTION, CONCENTRATE RESPIRATORY (INHALATION) at 17:08

## 2019-06-16 RX ADMIN — TIOTROPIUM BROMIDE 1 INH: 18 CAPSULE ORAL; RESPIRATORY (INHALATION) at 09:00

## 2019-06-16 RX ADMIN — MAGNESIUM OXIDE TAB 400 MG (241.3 MG ELEMENTAL MG) SCH MG: 400 (241.3 MG) TAB at 12:17

## 2019-06-16 RX ADMIN — GUAIFENESIN AND DEXTROMETHORPHAN 1 ML: 100; 10 SYRUP ORAL at 21:25

## 2019-06-16 RX ADMIN — LEVALBUTEROL SCH MG: 1.25 SOLUTION, CONCENTRATE RESPIRATORY (INHALATION) at 05:26

## 2019-06-16 RX ADMIN — IPRATROPIUM BROMIDE 1 MG: 0.5 SOLUTION RESPIRATORY (INHALATION) at 09:16

## 2019-06-16 RX ADMIN — FLUTICASONE FUROATE AND VILANTEROL TRIFENATATE SCH INH: 100; 25 POWDER RESPIRATORY (INHALATION) at 09:00

## 2019-06-16 RX ADMIN — METHYLPREDNISOLONE SODIUM SUCCINATE 1 MG: 125 INJECTION, POWDER, FOR SOLUTION INTRAMUSCULAR; INTRAVENOUS at 01:50

## 2019-06-16 RX ADMIN — ENOXAPARIN SODIUM SCH MG: 100 INJECTION SUBCUTANEOUS at 08:52

## 2019-06-16 RX ADMIN — METHYLPREDNISOLONE SODIUM SUCCINATE 1 MG: 125 INJECTION, POWDER, FOR SOLUTION INTRAMUSCULAR; INTRAVENOUS at 21:12

## 2019-06-16 RX ADMIN — MAGNESIUM OXIDE TAB 400 MG (241.3 MG ELEMENTAL MG) 1 MG: 400 (241.3 MG) TAB at 12:17

## 2019-06-16 RX ADMIN — IPRATROPIUM BROMIDE 1 MG: 0.5 SOLUTION RESPIRATORY (INHALATION) at 01:02

## 2019-06-16 RX ADMIN — MAGNESIUM OXIDE TAB 400 MG (241.3 MG ELEMENTAL MG) SCH MG: 400 (241.3 MG) TAB at 08:52

## 2019-06-16 RX ADMIN — MONTELUKAST SODIUM SCH MG: 10 TABLET, FILM COATED ORAL at 21:11

## 2019-06-16 RX ADMIN — MONTELUKAST SODIUM 1 MG: 10 TABLET, FILM COATED ORAL at 21:11

## 2019-06-16 RX ADMIN — MAGNESIUM OXIDE TAB 400 MG (241.3 MG ELEMENTAL MG) 1 MG: 400 (241.3 MG) TAB at 08:52

## 2019-06-16 RX ADMIN — LEVALBUTEROL 1 MG: 1.25 SOLUTION, CONCENTRATE RESPIRATORY (INHALATION) at 01:02

## 2019-06-16 RX ADMIN — GUAIFENESIN AND DEXTROMETHORPHAN 1 ML: 100; 10 SYRUP ORAL at 08:52

## 2019-06-16 RX ADMIN — LEVALBUTEROL SCH MG: 1.25 SOLUTION, CONCENTRATE RESPIRATORY (INHALATION) at 13:00

## 2019-06-16 RX ADMIN — MAGNESIUM OXIDE TAB 400 MG (241.3 MG ELEMENTAL MG) SCH MG: 400 (241.3 MG) TAB at 21:11

## 2019-06-16 RX ADMIN — IPRATROPIUM BROMIDE 1 MG: 0.5 SOLUTION RESPIRATORY (INHALATION) at 13:00

## 2019-06-16 RX ADMIN — ERGOCALCIFEROL 1 UNIT: 1.25 CAPSULE ORAL at 08:52

## 2019-06-16 RX ADMIN — LEVALBUTEROL 1 MG: 1.25 SOLUTION, CONCENTRATE RESPIRATORY (INHALATION) at 05:26

## 2019-06-16 RX ADMIN — LEVALBUTEROL SCH MG: 1.25 SOLUTION, CONCENTRATE RESPIRATORY (INHALATION) at 01:02

## 2019-06-16 RX ADMIN — LEVALBUTEROL 1 MG: 1.25 SOLUTION, CONCENTRATE RESPIRATORY (INHALATION) at 09:15

## 2019-06-16 RX ADMIN — IPRATROPIUM BROMIDE 1 MG: 0.5 SOLUTION RESPIRATORY (INHALATION) at 05:26

## 2019-06-16 RX ADMIN — IPRATROPIUM BROMIDE 1 MG: 0.5 SOLUTION RESPIRATORY (INHALATION) at 17:09

## 2019-06-16 RX ADMIN — ENOXAPARIN SODIUM 1 MG: 100 INJECTION SUBCUTANEOUS at 08:52

## 2019-06-16 RX ADMIN — FAMOTIDINE 1 MG: 20 TABLET ORAL at 08:52

## 2019-06-16 RX ADMIN — LEVOFLOXACIN SCH MG: 250 TABLET, FILM COATED ORAL at 05:56

## 2019-06-16 RX ADMIN — LEVALBUTEROL 1 MG: 1.25 SOLUTION, CONCENTRATE RESPIRATORY (INHALATION) at 13:00

## 2019-06-16 RX ADMIN — METHYLPREDNISOLONE SODIUM SUCCINATE 1 MG: 125 INJECTION, POWDER, FOR SOLUTION INTRAMUSCULAR; INTRAVENOUS at 08:53

## 2019-06-16 RX ADMIN — FLUTICASONE FUROATE AND VILANTEROL TRIFENATATE SCH INH: 100; 25 POWDER RESPIRATORY (INHALATION) at 21:36

## 2019-06-16 NOTE — PN
Date/Time of Note


Date/Time of Note


DATE: 6/16/19 


TIME: 08:04





Assessment/Plan


VTE Prophylaxis


Risk score (from Ns)>0 risk:  5


SCD applied (from [facility] [place]):  Yes


SCD contraindicated:  low risk/ambulating


Pharmacological prophylaxis:  heparin


Pharm contraindication:  low risk/ambulating





Lines/Catheters


IV Catheter Type (from [facility]):  Saline Lock





Assessment/Plan


Problems:  


(1) Urinary tract infection due to Proteus


Onset Date:  ~ 6/13/2019      Status:  Acute


Comment:  Patient is on antibiotic therapy and we have proof of this particular 


bacteria being sensitive to the drugs were using.  Continue standard course 


treatment





(2) COPD (chronic obstructive pulmonary disease)


Status:  Acute


Comment:  Patient reports she is back to baseline.  I have a suspicion that her 


definition of baseline might be our definition of needing admission.  I suspect 


she has chronic lung disease.  Curiously enough she has a negative QuantiFERON 


gold.  Regardless she continues on pulmonary treatments and we can see about 


titrating down on the FiO2 to see what her actual needs are.


Qualifiers:  


   COPD type:  chronic bronchitis  Chronic bronchitis type:  mixed simple and 


mucopurulent  Qualified Codes:  J41.8 - Mixed simple and mucopurulent chronic 


bronchitis


(3) History of tuberculosis


Status:  Chronic


Comment:  Note the negative QuantiFERON gold on the laboratory section.  We have


1 out of 3 sputum AFB smears negative.  When the other 2 were negative we can 


discharge the isolation depending upon how she is doing she can probably be dis


charged out of the hospital





(4) Grade I diastolic dysfunction


Status:  Chronic


Comment:  Noted.  No therapeutic intervention





Result Diagram:  


6/16/19 0442                                                                    


           6/16/19 0442





Results 24hrs





Laboratory Tests


               Test
                                6/16/19
04:42


               White Blood Count                         14.2  #H


               Red Blood Count                            4.03  L


               Hemoglobin                                  12.0


               Hematocrit                                  37.4


               Mean Corpuscular Volume                     92.8


               Mean Corpuscular Hemoglobin                 29.8


               Mean Corpuscular Hemoglobin
Concent        32.1  



               Red Cell Distribution Width                 13.8


               Platelet Count                               268


               Mean Platelet Volume                        10.2


               Immature Granulocytes %                   0.500  H


               Neutrophils %                              92.0  H


               Lymphocytes %                               4.2  L


               Monocytes %                                  3.2


               Eosinophils %                                0.0


               Basophils %                                  0.1


               Nucleated Red Blood Cells %                  0.0


               Immature Granulocytes #                   0.070  H


               Neutrophils #                              13.1  H


               Lymphocytes #                               0.6  L


               Monocytes #                                  0.5


               Eosinophils #                                0.0


               Basophils #                                  0.0


               Nucleated Red Blood Cells #                  0.0


               Sodium Level                                 138


               Potassium Level                              4.2


               Chloride Level                               104


               Carbon Dioxide Level                         32  H


               Anion Gap                                     2  L


               Blood Urea Nitrogen                          21  H


               Creatinine                                  0.60


               Est Glomerular Filtrat Rate
mL/min     



               Glucose Level                                150


               Calcium Level                                8.4


               Total Bilirubin                              0.5


               Direct Bilirubin                            0.00


               Indirect Bilirubin                           0.5


               Aspartate Amino Transf
(AST/SGOT)           55  H



               Alanine Aminotransferase
(ALT/SGPT)          47  



               Alkaline Phosphatase                          75


               C-Reactive Protein                          1.1  H


               Total Protein                               6.1  #


               Albumin                                     3.1  L


               Globulin                                    3.00


               Albumin/Globulin Ratio                      1.03





CC:   KELI CONCEPCION MD; AMY ALFORD MD, [facility] ;





Subjective


24 Hr Interval Summary


Free Text/Dictation


Patient reports she is feeling better and is close to baseline from pulmonary 


standpoint.  This is confirmed by her family members.


Constitutional:  no complaints (No fevers chills or sweats)


Respiratory:  no complaints (No further cough and denies shortness of breath)


Cardiovascular:  no complaints


Gastrointestinal:  no complaints


Genitourinary:  no complaints





Exam/Review of Systems


Exam


Vitals





Vital Signs


  Date      Temp  Pulse  Resp  B/P (MAP)   Pulse Ox  O2          O2 Flow    FiO2


Time                                                 Delivery    Rate


   6/16/19  98.2     86    18      111/60        98  Nasal


     07:18                           (77)            Cannula


   6/16/19                                                             2.0


     05:28


   6/14/19                                                                    21


     01:13








Intake and Output





6/15/19


6/15/19


6/16/19





1515:00


23:00


07:00





IntakeIntake Total


600 ml


250 ml





BalanceBalance


600 ml


250 ml











Constitutional:  alert, oriented


Neck:  supple, non-tender


Respiratory:  normal air movement, crackles/rales (Fine crackles especially in 


the upper airways)


Cardiovascular:  regular rate and rhythm, nl pulses


Gastrointestinal:  soft, nl liver, spleen, non-tender





Results


Results 24hrs





Laboratory Tests


               Test
                                6/16/19
04:42


               White Blood Count                         14.2  #H


               Red Blood Count                            4.03  L


               Hemoglobin                                  12.0


               Hematocrit                                  37.4


               Mean Corpuscular Volume                     92.8


               Mean Corpuscular Hemoglobin                 29.8


               Mean Corpuscular Hemoglobin
Concent        32.1  



               Red Cell Distribution Width                 13.8


               Platelet Count                               268


               Mean Platelet Volume                        10.2


               Immature Granulocytes %                   0.500  H


               Neutrophils %                              92.0  H


               Lymphocytes %                               4.2  L


               Monocytes %                                  3.2


               Eosinophils %                                0.0


               Basophils %                                  0.1


               Nucleated Red Blood Cells %                  0.0


               Immature Granulocytes #                   0.070  H


               Neutrophils #                              13.1  H


               Lymphocytes #                               0.6  L


               Monocytes #                                  0.5


               Eosinophils #                                0.0


               Basophils #                                  0.0


               Nucleated Red Blood Cells #                  0.0


               Sodium Level                                 138


               Potassium Level                              4.2


               Chloride Level                               104


               Carbon Dioxide Level                         32  H


               Anion Gap                                     2  L


               Blood Urea Nitrogen                          21  H


               Creatinine                                  0.60


               Est Glomerular Filtrat Rate
mL/min     



               Glucose Level                                150


               Calcium Level                                8.4


               Total Bilirubin                              0.5


               Direct Bilirubin                            0.00


               Indirect Bilirubin                           0.5


               Aspartate Amino Transf
(AST/SGOT)           55  H



               Alanine Aminotransferase
(ALT/SGPT)          47  



               Alkaline Phosphatase                          75


               C-Reactive Protein                          1.1  H


               Total Protein                               6.1  #


               Albumin                                     3.1  L


               Globulin                                    3.00


               Albumin/Globulin Ratio                      1.03








Medications


Medication





Current Medications


Levalbuterol (Xopenex Neb) 1.25 mg Q4H RESP  THERAPY HHN  Last administered on 


6/16/19at 05:26; Admin Dose 1.25 MG;  Start 6/13/19 at 05:00


Ipratropium Bromide (Atrovent 0.02% (Neb)) 0.5 mg Q4H RESP  THERAPY HHN  Last 


administered on 6/16/19at 05:26; Admin Dose 0.5 MG;  Start 6/13/19 at 05:00


IV Flush (NS 3 ml) 3 ml PER PROTOCOL IV ;  Start 6/13/19 at 03:00


Ondansetron HCl (Zofran Inj) 4 mg Q6H  PRN IV NAUSEA/VOMITING;  Start 6/13/19 at


03:00


Acetaminophen (Tylenol Tab) 650 mg Q6H  PRN PO .PAIN 1-3 OR TEMP Last 


administered on 6/15/19at 08:59; Admin Dose 650 MG;  Start 6/13/19 at 03:00


Docusate Sodium (Colace) 100 mg Q12H  PRN PO .CONSTIPATION;  Start 6/13/19 at 


03:00


Bisacodyl (Dulcolax) 5 mg DAILY  PRN PO .CONSTIPATION;  Start 6/13/19 at 03:00


Famotidine (Pepcid) 20 mg DAILY PO  Last administered on 6/15/19at 08:50; Admin 


Dose 20 MG;  Start 6/14/19 at 09:00


Guaifenesin/ Dextromethorphan (Robitussin Dm Liquid Cup) 10 ml Q4  PRN PO COUGH 


Last administered on 6/15/19at 20:51; Admin Dose 10 ML;  Start 6/13/19 at 17:30


Magnesium Oxide (Mag-Ox 400) 400 mg TID PO  Last administered on 6/15/19at 


20:51; Admin Dose 400 MG;  Start 6/13/19 at 17:30


Enoxaparin Sodium (Lovenox) 40 mg DAILY SC  Last administered on 6/15/19at 


08:52; Admin Dose 40 MG;  Start 6/14/19 at 09:00


Levofloxacin (Levaquin) 250 mg DAILY@06 PO  Last administered on 6/16/19at 


05:56; Admin Dose 250 MG;  Start 6/15/19 at 06:00;  Stop 6/22/19 at 05:59


Tiotropium Bromide (Spiriva) 1 inh DAILY INH ;  Start 6/15/19 at 11:00


Fluticasone/ Vilanterol (Breo Ellipta 100-25 Mcg Inh) 1 inh DAILY INH ;  Start 


6/15/19 at 11:00


Montelukast Sodium (Singulair) 10 mg HS PO  Last administered on 6/15/19at 


20:51; Admin Dose 10 MG;  Start 6/15/19 at 21:00


Methylprednisolone Sodium Succinate (Solu-Medrol) 30 mg Q12 IV ;  Start 6/16/19 


at 09:00;  Stop 6/17/19 at 08:59;  Status UNV


Ergocalciferol (Drisdol) 50,000 unit Caro@09 PO ;  Start 6/16/19 at 09:00;  Status


ALEXIA SAXENA MD              Jun 16, 2019 08:08

## 2019-06-16 NOTE — CONS
Consult Date/Type/Reason


Admit Date/Time


Jun 13, 2019 at 02:00


Initial Consult Date


6/14/19


Type of Consultation:  Pulm


Date/Time of Note


DATE: 6/16/19 


TIME: 15:28





Subjective


No events. Remains on 2 L oxygen via NC.





Objective


Vitals





Vital Signs


  Date      Temp  Pulse  Resp  B/P (MAP)   Pulse Ox  O2          O2 Flow    FiO2


Time                                                 Delivery    Rate


   6/16/19  98.5     88    16      135/70        97  Room Air


     14:42                           (91)


   6/16/19                                                             2.0


     05:28


   6/14/19                                                                    21


     01:13








Intake and Output





6/15/19


6/15/19


6/16/19





1515:00


23:00


07:00





IntakeIntake Total


600 ml


250 ml





BalanceBalance


600 ml


250 ml











Exam


HEENT: Neck supple; no JVD; no LAD


CVS: RRR, S1 and S2


CHEST: Subtle wheezes upper lung zones 


ABD: Soft, NT, + BS


EXT: No c/c/e











Results/Medications


Result Diagram:  


6/16/19 0442 6/16/19 0442





Results 24 hrs





Laboratory Tests


               Test
                                6/16/19
04:42


               White Blood Count                         14.2  #H


               Red Blood Count                            4.03  L


               Hemoglobin                                  12.0


               Hematocrit                                  37.4


               Mean Corpuscular Volume                     92.8


               Mean Corpuscular Hemoglobin                 29.8


               Mean Corpuscular Hemoglobin
Concent        32.1  



               Red Cell Distribution Width                 13.8


               Platelet Count                               268


               Mean Platelet Volume                        10.2


               Immature Granulocytes %                   0.500  H


               Neutrophils %                              92.0  H


               Lymphocytes %                               4.2  L


               Monocytes %                                  3.2


               Eosinophils %                                0.0


               Basophils %                                  0.1


               Nucleated Red Blood Cells %                  0.0


               Immature Granulocytes #                   0.070  H


               Neutrophils #                              13.1  H


               Lymphocytes #                               0.6  L


               Monocytes #                                  0.5


               Eosinophils #                                0.0


               Basophils #                                  0.0


               Nucleated Red Blood Cells #                  0.0


               Erythrocyte Sedimentation Rate                 9


               Sodium Level                                 138


               Potassium Level                              4.2


               Chloride Level                               104


               Carbon Dioxide Level                         32  H


               Anion Gap                                     2  L


               Blood Urea Nitrogen                          21  H


               Creatinine                                  0.60


               Est Glomerular Filtrat Rate
mL/min     



               Glucose Level                                150


               Calcium Level                                8.4


               Total Bilirubin                              0.5


               Direct Bilirubin                            0.00


               Indirect Bilirubin                           0.5


               Aspartate Amino Transf
(AST/SGOT)           55  H



               Alanine Aminotransferase
(ALT/SGPT)          47  



               Alkaline Phosphatase                          75


               C-Reactive Protein                          1.1  H


               Total Protein                               6.1  #


               Albumin                                     3.1  L


               Globulin                                    3.00


               Albumin/Globulin Ratio                      1.03





Home Meds


Reported Medications


[Ultibro]   No Conflict Check, INH DAILY PRN for SHORTNESS OF BREATH


   6/13/19


Phenylephrine/Diphenhydramine (DIMETAPP COLD & CONGEST LIQUID) 118 Ml Liquid, 


118 ML PO


   6/13/19


Medications





Current Medications


Levalbuterol (Xopenex Neb) 1.25 mg Q4H RESP  THERAPY HHN  Last administered on 


6/16/19at 09:15; Admin Dose 1.25 MG;  Start 6/13/19 at 05:00


Ipratropium Bromide (Atrovent 0.02% (Neb)) 0.5 mg Q4H RESP  THERAPY HHN  Last 


administered on 6/16/19at 09:16; Admin Dose 0.5 MG;  Start 6/13/19 at 05:00


IV Flush (NS 3 ml) 3 ml PER PROTOCOL IV ;  Start 6/13/19 at 03:00


Ondansetron HCl (Zofran Inj) 4 mg Q6H  PRN IV NAUSEA/VOMITING;  Start 6/13/19 at


03:00


Acetaminophen (Tylenol Tab) 650 mg Q6H  PRN PO .PAIN 1-3 OR TEMP Last administ


ered on 6/15/19at 08:59; Admin Dose 650 MG;  Start 6/13/19 at 03:00


Docusate Sodium (Colace) 100 mg Q12H  PRN PO .CONSTIPATION;  Start 6/13/19 at 


03:00


Bisacodyl (Dulcolax) 5 mg DAILY  PRN PO .CONSTIPATION;  Start 6/13/19 at 03:00


Famotidine (Pepcid) 20 mg DAILY PO  Last administered on 6/16/19at 08:52; Admin 


Dose 20 MG;  Start 6/14/19 at 09:00


Guaifenesin/ Dextromethorphan (Robitussin Dm Liquid Cup) 10 ml Q4  PRN PO COUGH 


Last administered on 6/16/19at 08:52; Admin Dose 10 ML;  Start 6/13/19 at 17:30


Magnesium Oxide (Mag-Ox 400) 400 mg TID PO  Last administered on 6/16/19at 


12:17; Admin Dose 400 MG;  Start 6/13/19 at 17:30


Enoxaparin Sodium (Lovenox) 40 mg DAILY SC  Last administered on 6/16/19at 


08:52; Admin Dose 40 MG;  Start 6/14/19 at 09:00


Levofloxacin (Levaquin) 250 mg DAILY@06 PO  Last administered on 6/16/19at 


05:56; Admin Dose 250 MG;  Start 6/15/19 at 06:00;  Stop 6/22/19 at 05:59


Tiotropium Bromide (Spiriva) 1 inh DAILY INH ;  Start 6/15/19 at 11:00


Fluticasone/ Vilanterol (Breo Ellipta 100-25 Mcg Inh) 1 inh DAILY INH ;  Start 


6/15/19 at 11:00


Montelukast Sodium (Singulair) 10 mg HS PO  Last administered on 6/15/19at 


20:51; Admin Dose 10 MG;  Start 6/15/19 at 21:00


Methylprednisolone Sodium Succinate (Solu-Medrol) 30 mg Q12 IV  Last 


administered on 6/16/19at 08:53; Admin Dose 30 MG;  Start 6/16/19 at 09:00;  


Stop 6/17/19 at 08:59


Ergocalciferol (Drisdol) 50,000 unit Caro@09 PO  Last administered on 6/16/19at 


08:52; Admin Dose 50,000 UNIT;  Start 6/16/19 at 09:00





Assessment/Plan


Assessment/Plan (Daily)


IMP:


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 Other 


considerations would include diffuse particulate lung disease ([place] Lung) and 


pleuropulmonary fibroelastosis. 


2. Likely chronic hypercapnic/hypoxemic respiratory insufficiency 





RECS:


1. Continue BDs


2. Agree with sputa for AFB x 3 though findings simply suggestive of old TB


3. Taper CS


4. Home O2 eval


5. Am ABG


6. Outpatient PFTs











KELI CONCEPCION MD              Jun 16, 2019 15:31

## 2019-06-17 VITALS — HEART RATE: 90 BPM | RESPIRATION RATE: 17 BRPM | SYSTOLIC BLOOD PRESSURE: 130 MMHG | DIASTOLIC BLOOD PRESSURE: 75 MMHG

## 2019-06-17 VITALS — RESPIRATION RATE: 17 BRPM | HEART RATE: 96 BPM | SYSTOLIC BLOOD PRESSURE: 159 MMHG | DIASTOLIC BLOOD PRESSURE: 80 MMHG

## 2019-06-17 VITALS — SYSTOLIC BLOOD PRESSURE: 155 MMHG | DIASTOLIC BLOOD PRESSURE: 86 MMHG | RESPIRATION RATE: 17 BRPM | HEART RATE: 94 BPM

## 2019-06-17 VITALS — SYSTOLIC BLOOD PRESSURE: 140 MMHG | HEART RATE: 94 BPM | RESPIRATION RATE: 18 BRPM | DIASTOLIC BLOOD PRESSURE: 70 MMHG

## 2019-06-17 VITALS — RESPIRATION RATE: 17 BRPM | HEART RATE: 83 BPM | SYSTOLIC BLOOD PRESSURE: 126 MMHG | DIASTOLIC BLOOD PRESSURE: 58 MMHG

## 2019-06-17 VITALS — SYSTOLIC BLOOD PRESSURE: 159 MMHG | DIASTOLIC BLOOD PRESSURE: 80 MMHG | RESPIRATION RATE: 17 BRPM | HEART RATE: 96 BPM

## 2019-06-17 LAB
ALLEN TEST: (no result)
ARTERIAL BASE EXCESS: 4.9 MMOL/L (ref -3–3)
ARTERIAL BLOOD GAS OXYGEN SAT: 98.7 MMHG (ref 95–100)
ARTERIAL COHB: 0.4 % (ref 0–3)
ARTERIAL FRACTION OF OXYHGB: 98.1 % (ref 93–99)
ARTERIAL HCO3: 29.3 MMOL/L (ref 22–26)
ARTERIAL METHB: 0.2 % (ref 0–1.5)
ARTERIAL PCO2: 42.5 MMHG (ref 35–45)
FIO2: 27 %
MODE: (no result)
O2 A-A PPRESDIFF RESPIRATORY: 14.5 MMHG (ref 7–24)

## 2019-06-17 RX ADMIN — MONTELUKAST SODIUM SCH MG: 10 TABLET, FILM COATED ORAL at 20:19

## 2019-06-17 RX ADMIN — IPRATROPIUM BROMIDE 1 MG: 0.5 SOLUTION RESPIRATORY (INHALATION) at 19:33

## 2019-06-17 RX ADMIN — LEVALBUTEROL SCH MG: 1.25 SOLUTION, CONCENTRATE RESPIRATORY (INHALATION) at 04:43

## 2019-06-17 RX ADMIN — LEVALBUTEROL SCH MG: 1.25 SOLUTION, CONCENTRATE RESPIRATORY (INHALATION) at 12:10

## 2019-06-17 RX ADMIN — MAGNESIUM OXIDE TAB 400 MG (241.3 MG ELEMENTAL MG) SCH MG: 400 (241.3 MG) TAB at 13:12

## 2019-06-17 RX ADMIN — FLUTICASONE FUROATE AND VILANTEROL TRIFENATATE 1 INH: 100; 25 POWDER RESPIRATORY (INHALATION) at 09:48

## 2019-06-17 RX ADMIN — LEVALBUTEROL SCH MG: 1.25 SOLUTION, CONCENTRATE RESPIRATORY (INHALATION) at 08:19

## 2019-06-17 RX ADMIN — MAGNESIUM OXIDE TAB 400 MG (241.3 MG ELEMENTAL MG) 1 MG: 400 (241.3 MG) TAB at 13:12

## 2019-06-17 RX ADMIN — IPRATROPIUM BROMIDE 1 MG: 0.5 SOLUTION RESPIRATORY (INHALATION) at 08:19

## 2019-06-17 RX ADMIN — MAGNESIUM OXIDE TAB 400 MG (241.3 MG ELEMENTAL MG) 1 MG: 400 (241.3 MG) TAB at 20:19

## 2019-06-17 RX ADMIN — LEVALBUTEROL 1 MG: 1.25 SOLUTION, CONCENTRATE RESPIRATORY (INHALATION) at 00:46

## 2019-06-17 RX ADMIN — MAGNESIUM OXIDE TAB 400 MG (241.3 MG ELEMENTAL MG) SCH MG: 400 (241.3 MG) TAB at 09:48

## 2019-06-17 RX ADMIN — LEVOFLOXACIN SCH MG: 250 TABLET, FILM COATED ORAL at 06:04

## 2019-06-17 RX ADMIN — LEVALBUTEROL 1 MG: 1.25 SOLUTION, CONCENTRATE RESPIRATORY (INHALATION) at 12:10

## 2019-06-17 RX ADMIN — LEVOFLOXACIN 1 MG: 250 TABLET, FILM COATED ORAL at 06:04

## 2019-06-17 RX ADMIN — LEVALBUTEROL 1 MG: 1.25 SOLUTION, CONCENTRATE RESPIRATORY (INHALATION) at 19:33

## 2019-06-17 RX ADMIN — LEVALBUTEROL 1 MG: 1.25 SOLUTION, CONCENTRATE RESPIRATORY (INHALATION) at 04:43

## 2019-06-17 RX ADMIN — IPRATROPIUM BROMIDE 1 MG: 0.5 SOLUTION RESPIRATORY (INHALATION) at 12:10

## 2019-06-17 RX ADMIN — MAGNESIUM OXIDE TAB 400 MG (241.3 MG ELEMENTAL MG) 1 MG: 400 (241.3 MG) TAB at 09:48

## 2019-06-17 RX ADMIN — MONTELUKAST SODIUM 1 MG: 10 TABLET, FILM COATED ORAL at 20:19

## 2019-06-17 RX ADMIN — IPRATROPIUM BROMIDE 1 MG: 0.5 SOLUTION RESPIRATORY (INHALATION) at 00:46

## 2019-06-17 RX ADMIN — IPRATROPIUM BROMIDE 1 MG: 0.5 SOLUTION RESPIRATORY (INHALATION) at 13:47

## 2019-06-17 RX ADMIN — IPRATROPIUM BROMIDE 1 MG: 0.5 SOLUTION RESPIRATORY (INHALATION) at 04:43

## 2019-06-17 RX ADMIN — LEVALBUTEROL SCH MG: 1.25 SOLUTION, CONCENTRATE RESPIRATORY (INHALATION) at 00:46

## 2019-06-17 RX ADMIN — TIOTROPIUM BROMIDE 1 INH: 18 CAPSULE ORAL; RESPIRATORY (INHALATION) at 09:48

## 2019-06-17 RX ADMIN — LEVALBUTEROL SCH MG: 1.25 SOLUTION, CONCENTRATE RESPIRATORY (INHALATION) at 13:47

## 2019-06-17 RX ADMIN — FAMOTIDINE 1 MG: 20 TABLET ORAL at 09:48

## 2019-06-17 RX ADMIN — ENOXAPARIN SODIUM SCH MG: 100 INJECTION SUBCUTANEOUS at 09:52

## 2019-06-17 RX ADMIN — TIOTROPIUM BROMIDE 1 INH: 18 CAPSULE ORAL; RESPIRATORY (INHALATION) at 00:23

## 2019-06-17 RX ADMIN — LEVALBUTEROL 1 MG: 1.25 SOLUTION, CONCENTRATE RESPIRATORY (INHALATION) at 08:19

## 2019-06-17 RX ADMIN — MAGNESIUM OXIDE TAB 400 MG (241.3 MG ELEMENTAL MG) SCH MG: 400 (241.3 MG) TAB at 20:19

## 2019-06-17 RX ADMIN — FAMOTIDINE SCH MG: 20 TABLET ORAL at 09:48

## 2019-06-17 RX ADMIN — FLUTICASONE FUROATE AND VILANTEROL TRIFENATATE SCH INH: 100; 25 POWDER RESPIRATORY (INHALATION) at 09:48

## 2019-06-17 RX ADMIN — LEVALBUTEROL SCH MG: 1.25 SOLUTION, CONCENTRATE RESPIRATORY (INHALATION) at 19:33

## 2019-06-17 RX ADMIN — ENOXAPARIN SODIUM 1 MG: 100 INJECTION SUBCUTANEOUS at 09:52

## 2019-06-17 RX ADMIN — LEVALBUTEROL 1 MG: 1.25 SOLUTION, CONCENTRATE RESPIRATORY (INHALATION) at 13:47

## 2019-06-17 NOTE — PN
Date/Time of Note


Date/Time of Note


DATE: 6/17/19 


TIME: 14:57





Objective


Vitals





Vital Signs


  Date      Temp  Pulse  Resp  B/P (MAP)   Pulse Ox  O2          O2 Flow    FiO2


Time                                                 Delivery    Rate


   6/17/19  98.5     90    17      130/75        98  Room Air


     14:38                           (93)


   6/17/19                                                             2.0


     13:49


   6/14/19                                                                    21


     01:13








Intake and Output





6/16/19 6/16/19 6/17/19





1414:59


22:59


06:59





IntakeIntake Total


600 ml


240 ml





BalanceBalance


600 ml


240 ml














Results


Result Diagram:  


6/16/19 0442                                                                    


           6/16/19 0442








Medications


Medications





Current Medications


IV Flush (NS 3 ml) 3 ml PER PROTOCOL IV ;  Start 6/13/19 at 03:00


Ondansetron HCl (Zofran Inj) 4 mg Q6H  PRN IV NAUSEA/VOMITING;  Start 6/13/19 at


03:00


Acetaminophen (Tylenol Tab) 650 mg Q6H  PRN PO .PAIN 1-3 OR TEMP Last 


administered on 6/15/19at 08:59; Admin Dose 650 MG;  Start 6/13/19 at 03:00


Docusate Sodium (Colace) 100 mg Q12H  PRN PO .CONSTIPATION;  Start 6/13/19 at 


03:00


Bisacodyl (Dulcolax) 5 mg DAILY  PRN PO .CONSTIPATION;  Start 6/13/19 at 03:00


Famotidine (Pepcid) 20 mg DAILY PO  Last administered on 6/17/19at 09:48; Admin 


Dose 20 MG;  Start 6/14/19 at 09:00


Guaifenesin/ Dextromethorphan (Robitussin Dm Liquid Cup) 10 ml Q4  PRN PO COUGH 


Last administered on 6/16/19at 21:25; Admin Dose 10 ML;  Start 6/13/19 at 17:30


Magnesium Oxide (Mag-Ox 400) 400 mg TID PO  Last administered on 6/17/19at 


13:12; Admin Dose 400 MG;  Start 6/13/19 at 17:30


Enoxaparin Sodium (Lovenox) 40 mg DAILY SC  Last administered on 6/17/19at 


09:52; Admin Dose 40 MG;  Start 6/14/19 at 09:00


Levofloxacin (Levaquin) 250 mg DAILY@06 PO  Last administered on 6/17/19at 


06:04; Admin Dose 250 MG;  Start 6/15/19 at 06:00;  Stop 6/22/19 at 05:59


Fluticasone/ Vilanterol (Breo Ellipta 100-25 Mcg Inh) 1 inh DAILY INH  Last 


administered on 6/17/19at 09:48; Admin Dose 1 INH;  Start 6/15/19 at 11:00


Montelukast Sodium (Singulair) 10 mg HS PO  Last administered on 6/16/19at 


21:11; Admin Dose 10 MG;  Start 6/15/19 at 21:00


Ergocalciferol (Drisdol) 50,000 unit Caro@09 PO  Last administered on 6/16/19at 


08:52; Admin Dose 50,000 UNIT;  Start 6/16/19 at 09:00


Tiotropium Bromide (Spiriva) 1 inh DAILY INH  Last administered on 6/17/19at 


09:48; Admin Dose 1 INH;  Start 6/16/19 at 21:51


Ipratropium Bromide (Atrovent 0.02% (Neb)) 0.5 mg Q6H RESP  THERAPY HHN  Last 


administered on 6/17/19at 13:47; Admin Dose 0.5 MG;  Start 6/17/19 at 14:00


Levalbuterol (Xopenex Neb) 1.25 mg Q6H RESP  THERAPY HHN  Last administered on 


6/17/19at 13:47; Admin Dose 1.25 MG;  Start 6/17/19 at 14:00


Prednisone (Prednisone) 40 mg DAILY PO  Last administered on 6/17/19at 13:12; 


Admin Dose 40 MG;  Start 6/17/19 at 13:00





VTE Prophylaxis


Risk score (from Nsg)>0 risk:  5


SCD applied (from Nsg):  Yes





Lines/Catheters


IV Catheter Type:  


Fernandez in Place:  No





Assessment/Plan


Hospital Course


Subjective


Patient feeling better





Objective





Physical exam





General: Patient is laying in bed and answers questions appropriately


Mentation: Patient is alert and oriented 4,


Head: Normocephalic atraumatic


Eyes: EOMI, pupils reactive to light


Neck: Supple, nontender, midline


Respiratory: Mild coarseness to auscultation bilaterally


Cardiovascular: regular rate, no obvious murmurs


Gastrointestinal: non-tender to palpation, bowel sounds heard.


Neurological: Moves all extremities spontaneously


Skin: No new skin lesions





Assessment and plan





Urinary tract infection


-Continue antibiotics





COPD


-Continue nebulizer


-Pulmonary recommendations appreciated





Pulmonary fibrosis versus other fibrotic lung disease


-Pulmonary recommendations appreciated


-Patient will need continued follow-up with a pulmonologist


-Titrate down to Breo and Spiriva eventually


-Patient will need to get medications from [facility] as patient has no 


insurance


-Pulmonology okay with discontinuing TB precautions, negative TB





Grade 1 diastolic dysfunction


-Chronic


-Monitor





Disposition


-Due to patient's no insurance status, will need to appropriately titrate 


medications and discharge to follow-up in the [facility] clinic, anticipate 


discharge within 1 to 2 days.











STEFAN MCGREGOR                    Jun 17, 2019 14:57

## 2019-06-17 NOTE — CONS
Consultation Date/Type/Reason


Admit Date/Time


Jun 13, 2019 at 02:00


Initial Consult Date


6/14/19


Type of Consult


Pulmonary





Patient is a very pleasant 86-year-old lady who just came in from [place] few 


days ago and after she got here the patient started having chest congestion and 


wheezing and low-grade fever.  According to her she was absolutely fine prior to


the onset of symptoms just a few days ago.  Patient has been started on 


appropriate bronchodilator as well as systemic steroid regimen.  Patient is 


reporting some improvement in symptoms.  Denies any further fever, any chest 


pain, sore throat, or any other constitutional symptoms.  Denies any chronic 


respiratory symptoms.





Past medical history; unremarkable.  


Medications; reviewed.





Allergies; penicillin.


Social history; never smoked.


Family history; noncontributory.  Patient does have a supportive family.


Occupational history; patient used to work in the fields.


Review of systems; denies any headache, visual changes, sinus symptoms.  Any 


chest pain, angina, complains of scant cough without any sputum production or 


hemoptysis.  Complains of wheezing.  Denies any chronic respiratory symptoms.  


Denies any abdominal pain, nausea, vomiting.  Any melena hematochezia.  Any 


urinary symptoms.  Any edema.  Any skin changes.  Any arthritis symptoms.  Any 


weight loss.  Any night sweats or chills.


General exam; elderly woman, awake alert, currently in no distress.


Date/Time of Note


DATE: 6/17/19 


TIME: 11:43





24 HR Interval Summary


Free Text/Dictation


Patient's condition is stable.  Remains awake and alert.  Denies any wheezing, 


coughing, shortness of breath any sputum production.


General exam; elderly lady, awake alert, currently in no distress.





H EENT exam; supple neck, no JVD.  No lymphadenopathy.  Midline trachea.  No 


thyromegaly.  Patient is edentulous.


Chest exam; clear to auscultation.  S1-S2 audible, no murmurs.  Regular rhythm.


Abdomen exam; soft, no organomegaly.  Bowel sounds audible.


Extremity exam; no peripheral edema clubbing.


CNS exam; no focal deficit.





Assessment and recommendations;





1.  Patient admitted with COPD exacerbation and acute bronchitis with marked 


interval improvement.


2.  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 


scarring.  Clinically there is no evidence to suggest any ongoing active 


synovitis infection.





Consider discharge.  Obtain ambulatory pulse oximetry to rule out exertional 


hypoxemia.  Outpatient PFT.





Exam/Review of Systems


Exam


Vitals





Vital Signs


  Date      Temp  Pulse  Resp  B/P (MAP)   Pulse Ox  O2          O2 Flow    FiO2


Time                                                 Delivery    Rate


   6/17/19                                           Nasal             2.0


     10:57                                           Cannula


   6/17/19  98.4     96    17      159/80        98


     08:29                          (106)


   6/14/19                                                                    21


     01:13








Intake and Output





6/16/19 6/16/19 6/17/19





1515:00


23:00


07:00





IntakeIntake Total


600 ml


240 ml





BalanceBalance


600 ml


240 ml














Results


Result Diagram:  


6/16/19 0442                                                                    


           6/16/19 0442





Results 24hrs





Laboratory Tests


   Test
                                       6/17/19
08:00    6/17/19
08:55


   Blood Gas Specimen Source
           Blood arterial
       



   Arterial Blood Date Drawn
           6/17/2019
8:13:57 AM  



   Arterial Blood pH (Temp
corrected)              7.456  H
  



   Arterial Blood pCO2 (Temp
correct)                42.5  
  



   Arterial Blood pO2 (Temp
corrected)             127.8  H
  



   Arterial Blood HCO3                               29.3  H


   Arterial Blood Base Excess                         4.9  H


   Arterial Blood Oxygen
Saturation                  98.7  
  



   Hector Test                           ACCEPTAB


   Arterial Blood Gas Puncture
Site     Right Radial  
       



   Arterial Blood
Carboxyhemoglobin                   0.4  
  



   Arterial Blood Methemoglobin                        0.2


   Blood Gas A-a O2 Differential                      14.5


   Oxyhemoglobin Percent                              98.1


   Blood Gas Temperature                              37.0


   Blood Gas Modality                   NASAL CANNULA


   FiO2                                               27.0


   Blood Gas Notified Whom              TM


   Blood Gas Notified Time
             6/17/2019
8:32:36 AM  



   Lab Scanned Report                                         REFERENCE LAB








Medications


Medication





Current Medications


Levalbuterol (Xopenex Neb) 1.25 mg Q4H RESP  THERAPY HHN  Last administered on 


6/17/19at 08:19; Admin Dose 1.25 MG;  Start 6/13/19 at 05:00


Ipratropium Bromide (Atrovent 0.02% (Neb)) 0.5 mg Q4H RESP  THERAPY HHN  Last 


administered on 6/17/19at 08:19; Admin Dose 0.5 MG;  Start 6/13/19 at 05:00


IV Flush (NS 3 ml) 3 ml PER PROTOCOL IV ;  Start 6/13/19 at 03:00


Ondansetron HCl (Zofran Inj) 4 mg Q6H  PRN IV NAUSEA/VOMITING;  Start 6/13/19 at


03:00


Acetaminophen (Tylenol Tab) 650 mg Q6H  PRN PO .PAIN 1-3 OR TEMP Last 


administered on 6/15/19at 08:59; Admin Dose 650 MG;  Start 6/13/19 at 03:00


Docusate Sodium (Colace) 100 mg Q12H  PRN PO .CONSTIPATION;  Start 6/13/19 at 


03:00


Bisacodyl (Dulcolax) 5 mg DAILY  PRN PO .CONSTIPATION;  Start 6/13/19 at 03:00


Famotidine (Pepcid) 20 mg DAILY PO  Last administered on 6/17/19at 09:48; Admin 


Dose 20 MG;  Start 6/14/19 at 09:00


Guaifenesin/ Dextromethorphan (Robitussin Dm Liquid Cup) 10 ml Q4  PRN PO COUGH 


Last administered on 6/16/19at 21:25; Admin Dose 10 ML;  Start 6/13/19 at 17:30


Magnesium Oxide (Mag-Ox 400) 400 mg TID PO  Last administered on 6/17/19at 


09:48; Admin Dose 400 MG;  Start 6/13/19 at 17:30


Enoxaparin Sodium (Lovenox) 40 mg DAILY SC  Last administered on 6/17/19at 


09:52; Admin Dose 40 MG;  Start 6/14/19 at 09:00


Levofloxacin (Levaquin) 250 mg DAILY@06 PO  Last administered on 6/17/19at 


06:04; Admin Dose 250 MG;  Start 6/15/19 at 06:00;  Stop 6/22/19 at 05:59


Fluticasone/ Vilanterol (Breo Ellipta 100-25 Mcg Inh) 1 inh DAILY INH  Last 


administered on 6/17/19at 09:48; Admin Dose 1 INH;  Start 6/15/19 at 11:00


Montelukast Sodium (Singulair) 10 mg HS PO  Last administered on 6/16/19at 


21:11; Admin Dose 10 MG;  Start 6/15/19 at 21:00


Ergocalciferol (Drisdol) 50,000 unit Caro@09 PO  Last administered on 6/16/19at 


08:52; Admin Dose 50,000 UNIT;  Start 6/16/19 at 09:00


Tiotropium Bromide (Spiriva) 1 inh DAILY INH  Last administered on 6/17/19at 


09:48; Admin Dose 1 INH;  Start 6/16/19 at 21:51











KULDEEP HURST                    Jun 17, 2019 11:44

## 2019-06-18 VITALS — DIASTOLIC BLOOD PRESSURE: 60 MMHG | RESPIRATION RATE: 18 BRPM | SYSTOLIC BLOOD PRESSURE: 131 MMHG | HEART RATE: 89 BPM

## 2019-06-18 VITALS — RESPIRATION RATE: 18 BRPM | SYSTOLIC BLOOD PRESSURE: 131 MMHG | DIASTOLIC BLOOD PRESSURE: 62 MMHG | HEART RATE: 83 BPM

## 2019-06-18 VITALS — RESPIRATION RATE: 18 BRPM | SYSTOLIC BLOOD PRESSURE: 135 MMHG | HEART RATE: 85 BPM | DIASTOLIC BLOOD PRESSURE: 62 MMHG

## 2019-06-18 VITALS — RESPIRATION RATE: 18 BRPM | HEART RATE: 91 BPM | DIASTOLIC BLOOD PRESSURE: 66 MMHG | SYSTOLIC BLOOD PRESSURE: 138 MMHG

## 2019-06-18 LAB
ADD MAN DIFF?: NO
ANION GAP: 3 (ref 5–13)
BASOPHIL #: 0 10^3/UL (ref 0–0.1)
BASOPHILS %: 0.4 % (ref 0–2)
BLOOD UREA NITROGEN: 17 MG/DL (ref 7–20)
CALCIUM: 8.6 MG/DL (ref 8.4–10.2)
CARBON DIOXIDE: 33 MMOL/L (ref 21–31)
CHLORIDE: 101 MMOL/L (ref 97–110)
CREATININE: 0.65 MG/DL (ref 0.44–1)
EOSINOPHILS #: 0 10^3/UL (ref 0–0.5)
EOSINOPHILS %: 0 % (ref 0–7)
GLUCOSE: 101 MG/DL (ref 70–220)
HEMATOCRIT: 38.1 % (ref 37–47)
HEMOGLOBIN: 12.4 G/DL (ref 12–16)
IMMATURE GRANS #M: 0.11 10^3/UL (ref 0–0.03)
IMMATURE GRANS % (M): 1.1 % (ref 0–0.43)
LYMPHOCYTES #: 1.7 10^3/UL (ref 0.8–2.9)
LYMPHOCYTES %: 15.9 % (ref 15–51)
MAGNESIUM: 2.5 MG/DL (ref 1.7–2.5)
MEAN CORPUSCULAR HEMOGLOBIN: 30 PG (ref 29–33)
MEAN CORPUSCULAR HGB CONC: 32.5 G/DL (ref 32–37)
MEAN CORPUSCULAR VOLUME: 92.3 FL (ref 82–101)
MEAN PLATELET VOLUME: 10.1 FL (ref 7.4–10.4)
MONOCYTE #: 1.2 10^3/UL (ref 0.3–0.9)
MONOCYTES %: 12 % (ref 0–11)
NEUTROPHIL #: 7.3 10^3/UL (ref 1.6–7.5)
NEUTROPHILS %: 70.6 % (ref 39–77)
NUCLEATED RED BLOOD CELLS #: 0 10^3/UL (ref 0–0)
NUCLEATED RED BLOOD CELLS%: 0 /100WBC (ref 0–0)
PHOSPHORUS: 2.6 MG/DL (ref 2.5–4.9)
PLATELET COUNT: 293 10^3/UL (ref 140–415)
POTASSIUM: 4.1 MMOL/L (ref 3.5–5.1)
RED BLOOD COUNT: 4.13 10^6/UL (ref 4.2–5.4)
RED CELL DISTRIBUTION WIDTH: 13.7 % (ref 11.5–14.5)
SODIUM: 137 MMOL/L (ref 135–144)
WHITE BLOOD COUNT: 10.4 10^3/UL (ref 4.8–10.8)

## 2019-06-18 RX ADMIN — LEVALBUTEROL 1 MG: 1.25 SOLUTION, CONCENTRATE RESPIRATORY (INHALATION) at 01:51

## 2019-06-18 RX ADMIN — MONTELUKAST SODIUM 1 MG: 10 TABLET, FILM COATED ORAL at 20:49

## 2019-06-18 RX ADMIN — MAGNESIUM OXIDE TAB 400 MG (241.3 MG ELEMENTAL MG) SCH MG: 400 (241.3 MG) TAB at 20:49

## 2019-06-18 RX ADMIN — LEVOFLOXACIN SCH MG: 250 TABLET, FILM COATED ORAL at 05:23

## 2019-06-18 RX ADMIN — LEVALBUTEROL SCH MG: 1.25 SOLUTION, CONCENTRATE RESPIRATORY (INHALATION) at 01:51

## 2019-06-18 RX ADMIN — MONTELUKAST SODIUM SCH MG: 10 TABLET, FILM COATED ORAL at 20:49

## 2019-06-18 RX ADMIN — FLUTICASONE FUROATE AND VILANTEROL TRIFENATATE 1 INH: 100; 25 POWDER RESPIRATORY (INHALATION) at 09:57

## 2019-06-18 RX ADMIN — MAGNESIUM OXIDE TAB 400 MG (241.3 MG ELEMENTAL MG) 1 MG: 400 (241.3 MG) TAB at 20:49

## 2019-06-18 RX ADMIN — MAGNESIUM OXIDE TAB 400 MG (241.3 MG ELEMENTAL MG) 1 MG: 400 (241.3 MG) TAB at 09:58

## 2019-06-18 RX ADMIN — FAMOTIDINE 1 MG: 20 TABLET ORAL at 09:58

## 2019-06-18 RX ADMIN — MAGNESIUM OXIDE TAB 400 MG (241.3 MG ELEMENTAL MG) SCH MG: 400 (241.3 MG) TAB at 15:06

## 2019-06-18 RX ADMIN — TIOTROPIUM BROMIDE 1 INH: 18 CAPSULE ORAL; RESPIRATORY (INHALATION) at 09:57

## 2019-06-18 RX ADMIN — MAGNESIUM OXIDE TAB 400 MG (241.3 MG ELEMENTAL MG) SCH MG: 400 (241.3 MG) TAB at 09:58

## 2019-06-18 RX ADMIN — FAMOTIDINE SCH MG: 20 TABLET ORAL at 09:58

## 2019-06-18 RX ADMIN — ENOXAPARIN SODIUM SCH MG: 100 INJECTION SUBCUTANEOUS at 10:04

## 2019-06-18 RX ADMIN — LEVOFLOXACIN 1 MG: 250 TABLET, FILM COATED ORAL at 05:23

## 2019-06-18 RX ADMIN — ENOXAPARIN SODIUM 1 MG: 100 INJECTION SUBCUTANEOUS at 10:04

## 2019-06-18 RX ADMIN — FLUTICASONE FUROATE AND VILANTEROL TRIFENATATE SCH INH: 100; 25 POWDER RESPIRATORY (INHALATION) at 09:57

## 2019-06-18 RX ADMIN — MAGNESIUM OXIDE TAB 400 MG (241.3 MG ELEMENTAL MG) 1 MG: 400 (241.3 MG) TAB at 15:06

## 2019-06-18 RX ADMIN — IPRATROPIUM BROMIDE 1 MG: 0.5 SOLUTION RESPIRATORY (INHALATION) at 01:51

## 2019-06-18 NOTE — PN
Date/Time of Note


Date/Time of Note


DATE: 6/18/19 


TIME: 12:43





Objective


Vitals





Vital Signs


  Date      Temp  Pulse  Resp  B/P (MAP)   Pulse Ox  O2          O2 Flow    FiO2


Time                                                 Delivery    Rate


   6/18/19  97.5     83    18      131/62        95  Room Air


     07:35                           (85)


   6/18/19                                                                    21


     01:51


   6/17/19                                                             2.0


     21:00








Results


Result Diagram:  


6/18/19 0433                                                                    


           6/18/19 0433








Medications


Medications





Current Medications


IV Flush (NS 3 ml) 3 ml PER PROTOCOL IV ;  Start 6/13/19 at 03:00


Ondansetron HCl (Zofran Inj) 4 mg Q6H  PRN IV NAUSEA/VOMITING;  Start 6/13/19 at


03:00


Acetaminophen (Tylenol Tab) 650 mg Q6H  PRN PO .PAIN 1-3 OR TEMP Last 


administered on 6/15/19at 08:59; Admin Dose 650 MG;  Start 6/13/19 at 03:00


Docusate Sodium (Colace) 100 mg Q12H  PRN PO .CONSTIPATION;  Start 6/13/19 at 


03:00


Bisacodyl (Dulcolax) 5 mg DAILY  PRN PO .CONSTIPATION;  Start 6/13/19 at 03:00


Famotidine (Pepcid) 20 mg DAILY PO  Last administered on 6/18/19at 09:58; Admin 


Dose 20 MG;  Start 6/14/19 at 09:00


Guaifenesin/ Dextromethorphan (Robitussin Dm Liquid Cup) 10 ml Q4  PRN PO COUGH 


Last administered on 6/16/19at 21:25; Admin Dose 10 ML;  Start 6/13/19 at 17:30


Magnesium Oxide (Mag-Ox 400) 400 mg TID PO  Last administered on 6/18/19at 


09:58; Admin Dose 400 MG;  Start 6/13/19 at 17:30


Enoxaparin Sodium (Lovenox) 40 mg DAILY SC  Last administered on 6/18/19at 


10:04; Admin Dose 40 MG;  Start 6/14/19 at 09:00


Levofloxacin (Levaquin) 250 mg DAILY@06 PO  Last administered on 6/18/19at 05:2


3; Admin Dose 250 MG;  Start 6/15/19 at 06:00;  Stop 6/22/19 at 05:59


Fluticasone/ Vilanterol (Breo Ellipta 100-25 Mcg Inh) 1 inh DAILY INH  Last 


administered on 6/18/19at 09:57; Admin Dose 1 INH;  Start 6/15/19 at 11:00


Montelukast Sodium (Singulair) 10 mg HS PO  Last administered on 6/17/19at 20:1


9; Admin Dose 10 MG;  Start 6/15/19 at 21:00


Ergocalciferol (Drisdol) 50,000 unit Caro@09 PO  Last administered on 6/16/19at 


08:52; Admin Dose 50,000 UNIT;  Start 6/16/19 at 09:00


Tiotropium Bromide (Spiriva) 1 inh DAILY INH  Last administered on 6/18/19at 


09:57; Admin Dose 1 INH;  Start 6/16/19 at 21:51


Prednisone (Prednisone) 30 mg DAILY PO ;  Start 6/19/19 at 09:00





VTE Prophylaxis


Risk score (from [facility])>0 risk:  5


SCD applied (from [facility]):  Yes





Lines/Catheters


IV Catheter Type:  


Fernandez in Place:  No





Assessment/Plan


Hospital Course


Subjective


Patient feeling better





Objective





Physical exam





General: Patient is laying in bed and answers questions appropriately


Mentation: Patient is alert and oriented 4,


Head: Normocephalic atraumatic


Eyes: EOMI, pupils reactive to light


Neck: Supple, nontender, midline


Respiratory: clear to auscultation bilaterally


Cardiovascular: regular rate, no obvious murmurs


Gastrointestinal: non-tender to palpation, bowel sounds heard.


Neurological: Moves all extremities spontaneously


Skin: No new skin lesions





Assessment and plan





Urinary tract infection


-Continue antibiotics





COPD


-Stop nebulizers initiate inhalers


-Pulmonary recommendations appreciated





Pulmonary fibrosis versus other fibrotic lung disease


-Pulmonary recommendations appreciated


-Patient will need continued follow-up with a pulmonologist


-Titrate down to Breo and Spiriva 


-Patient will need to get medications from [facility] as patient has no 


insurance


-Pulmonology okay with discontinuing TB precautions, negative TB





Grade 1 diastolic dysfunction


-Chronic


-Monitor





Disposition


-Due to patient's no insurance status, will need to appropriately titrate 


medications and discharge to follow-up in the [facility] clinic, anticipate 


discharge within 1 to 2 days.











STEFAN MCGREGOR                    Jun 18, 2019 12:44

## 2019-06-18 NOTE — CONS
Consultation Date/Type/Reason


Admit Date/Time


Jun 13, 2019 at 02:00


Initial Consult Date


6/14/19


Type of Consult


Pulmonary





Patient is a very pleasant 86-year-old lady who just came in from [place] few 


days ago and after she got here the patient started having chest congestion and 


wheezing and low-grade fever.  According to her she was absolutely fine prior to


the onset of symptoms just a few days ago.  Patient has been started on 


appropriate bronchodilator as well as systemic steroid regimen.  Patient is 


reporting some improvement in symptoms.  Denies any further fever, any chest 


pain, sore throat, or any other constitutional symptoms.  Denies any chronic 


respiratory symptoms.





Past medical history; unremarkable.  


Medications; reviewed.





Allergies; penicillin.


Social history; never smoked.


Family history; noncontributory.  Patient does have a supportive family.


Occupational history; patient used to work in the fields.


Review of systems; denies any headache, visual changes, sinus symptoms.  Any 


chest pain, angina, complains of scant cough without any sputum production or 


hemoptysis.  Complains of wheezing.  Denies any chronic respiratory symptoms.  


Denies any abdominal pain, nausea, vomiting.  Any melena hematochezia.  Any 


urinary symptoms.  Any edema.  Any skin changes.  Any arthritis symptoms.  Any 


weight loss.  Any night sweats or chills.


General exam; elderly woman, awake alert, currently in no distress.


Date/Time of Note


DATE: 6/18/19 


TIME: 12:19





24 HR Interval Summary


Free Text/Dictation


Patient's condition is stable.  Remains completely awake and alert.  Denies any 


shortness of breath, coughing, wheezing.


General exam; elderly woman, awake alert, currently in no distress.  On room 


air.


H ENT exam; supple neck, no JVD.  No lymphadenopathy.  Midline trachea.  No 


thyromegaly.  Patient does have multiple carious teeth.


Chest exam; diminished but clear breath sounds.  S1-S2 audible, no murmurs.  


Regular rhythm.


Abdomen exam; soft, nontender.  No organomegaly.  Bowel sounds audible.


Extremity exam; no peripheral edema clubbing.


CNS exam; no focal deficit.





Assessment and recommendations;





1.  Patient admitted with acute bronchitis with severe wheezing with marked 


interval improvement.


2.  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


 No acute infection suspected.





Consider discharge.  Off antibiotics off steroids.





Exam/Review of Systems


Exam


Vitals





Vital Signs


  Date      Temp  Pulse  Resp  B/P (MAP)   Pulse Ox  O2          O2 Flow    FiO2


Time                                                 Delivery    Rate


   6/18/19  97.5     83    18      131/62        95  Room Air


     07:35                           (85)


   6/18/19                                                                    21


     01:51


   6/17/19                                                             2.0


     21:00








Results


Result Diagram:  


6/18/19 0433                                                                    


           6/18/19 0433





Results 24hrs





Laboratory Tests


               Test
                                6/18/19
04:33


               White Blood Count                          10.4  #


               Red Blood Count                            4.13  L


               Hemoglobin                                  12.4


               Hematocrit                                  38.1


               Mean Corpuscular Volume                     92.3


               Mean Corpuscular Hemoglobin                 30.0


               Mean Corpuscular Hemoglobin
Concent        32.5  



               Red Cell Distribution Width                 13.7


               Platelet Count                               293


               Mean Platelet Volume                        10.1


               Immature Granulocytes %                   1.100  H


               Neutrophils %                               70.6


               Lymphocytes %                               15.9


               Monocytes %                                12.0  H


               Eosinophils %                                0.0


               Basophils %                                  0.4


               Nucleated Red Blood Cells %                  0.0


               Immature Granulocytes #                   0.110  H


               Neutrophils #                                7.3


               Lymphocytes #                                1.7


               Monocytes #                                 1.2  H


               Eosinophils #                                0.0


               Basophils #                                  0.0


               Nucleated Red Blood Cells #                  0.0


               Sodium Level                                 137


               Potassium Level                              4.1


               Chloride Level                               101


               Carbon Dioxide Level                         33  H


               Anion Gap                                     3  L


               Blood Urea Nitrogen                           17


               Creatinine                                  0.65


               Est Glomerular Filtrat Rate
mL/min     



               Glucose Level                               101  #


               Calcium Level                                8.6


               Phosphorus Level                             2.6


               Magnesium Level                              2.5








Medications


Medication





Current Medications


IV Flush (NS 3 ml) 3 ml PER PROTOCOL IV ;  Start 6/13/19 at 03:00


Ondansetron HCl (Zofran Inj) 4 mg Q6H  PRN IV NAUSEA/VOMITING;  Start 6/13/19 at


03:00


Acetaminophen (Tylenol Tab) 650 mg Q6H  PRN PO .PAIN 1-3 OR TEMP Last 


administered on 6/15/19at 08:59; Admin Dose 650 MG;  Start 6/13/19 at 03:00


Docusate Sodium (Colace) 100 mg Q12H  PRN PO .CONSTIPATION;  Start 6/13/19 at 


03:00


Bisacodyl (Dulcolax) 5 mg DAILY  PRN PO .CONSTIPATION;  Start 6/13/19 at 03:00


Famotidine (Pepcid) 20 mg DAILY PO  Last administered on 6/18/19at 09:58; Admin 


Dose 20 MG;  Start 6/14/19 at 09:00


Guaifenesin/ Dextromethorphan (Robitussin Dm Liquid Cup) 10 ml Q4  PRN PO COUGH 


Last administered on 6/16/19at 21:25; Admin Dose 10 ML;  Start 6/13/19 at 17:30


Magnesium Oxide (Mag-Ox 400) 400 mg TID PO  Last administered on 6/18/19at 


09:58; Admin Dose 400 MG;  Start 6/13/19 at 17:30


Enoxaparin Sodium (Lovenox) 40 mg DAILY SC  Last administered on 6/18/19at 


10:04; Admin Dose 40 MG;  Start 6/14/19 at 09:00


Levofloxacin (Levaquin) 250 mg DAILY@06 PO  Last administered on 6/18/19at 


05:23; Admin Dose 250 MG;  Start 6/15/19 at 06:00;  Stop 6/22/19 at 05:59


Fluticasone/ Vilanterol (Breo Ellipta 100-25 Mcg Inh) 1 inh DAILY INH  Last 


administered on 6/18/19at 09:57; Admin Dose 1 INH;  Start 6/15/19 at 11:00


Montelukast Sodium (Singulair) 10 mg HS PO  Last administered on 6/17/19at 


20:19; Admin Dose 10 MG;  Start 6/15/19 at 21:00


Ergocalciferol (Drisdol) 50,000 unit Caro@09 PO  Last administered on 6/16/19at 


08:52; Admin Dose 50,000 UNIT;  Start 6/16/19 at 09:00


Tiotropium Bromide (Spiriva) 1 inh DAILY INH  Last administered on 6/18/19at 


09:57; Admin Dose 1 INH;  Start 6/16/19 at 21:51


Prednisone (Prednisone) 30 mg DAILY PO ;  Start 6/19/19 at 09:00











KULDEEP HURST 18, 2019 12:20

## 2019-06-19 VITALS — DIASTOLIC BLOOD PRESSURE: 57 MMHG | RESPIRATION RATE: 17 BRPM | HEART RATE: 83 BPM | SYSTOLIC BLOOD PRESSURE: 110 MMHG

## 2019-06-19 VITALS — RESPIRATION RATE: 18 BRPM | SYSTOLIC BLOOD PRESSURE: 130 MMHG | HEART RATE: 90 BPM | DIASTOLIC BLOOD PRESSURE: 67 MMHG

## 2019-06-19 LAB
ADD MAN DIFF?: NO
ANION GAP: 3 (ref 5–13)
BASOPHIL #: 0.1 10^3/UL (ref 0–0.1)
BASOPHILS %: 0.9 % (ref 0–2)
BLOOD UREA NITROGEN: 17 MG/DL (ref 7–20)
CALCIUM: 8.4 MG/DL (ref 8.4–10.2)
CARBON DIOXIDE: 34 MMOL/L (ref 21–31)
CHLORIDE: 100 MMOL/L (ref 97–110)
CREATININE: 0.77 MG/DL (ref 0.44–1)
EOSINOPHILS #: 0.1 10^3/UL (ref 0–0.5)
EOSINOPHILS %: 1.3 % (ref 0–7)
GLUCOSE: 105 MG/DL (ref 70–220)
HEMATOCRIT: 42.8 % (ref 37–47)
HEMOGLOBIN: 13.9 G/DL (ref 12–16)
IMMATURE GRANS #M: 0.27 10^3/UL (ref 0–0.03)
IMMATURE GRANS % (M): 3.2 % (ref 0–0.43)
LYMPHOCYTES #: 2.1 10^3/UL (ref 0.8–2.9)
LYMPHOCYTES %: 24.9 % (ref 15–51)
MAGNESIUM: 2.4 MG/DL (ref 1.7–2.5)
MEAN CORPUSCULAR HEMOGLOBIN: 30 PG (ref 29–33)
MEAN CORPUSCULAR HGB CONC: 32.5 G/DL (ref 32–37)
MEAN CORPUSCULAR VOLUME: 92.2 FL (ref 82–101)
MEAN PLATELET VOLUME: 9.6 FL (ref 7.4–10.4)
MONOCYTE #: 0.9 10^3/UL (ref 0.3–0.9)
MONOCYTES %: 11 % (ref 0–11)
NEUTROPHIL #: 5 10^3/UL (ref 1.6–7.5)
NEUTROPHILS %: 58.7 % (ref 39–77)
NUCLEATED RED BLOOD CELLS #: 0 10^3/UL (ref 0–0)
NUCLEATED RED BLOOD CELLS%: 0 /100WBC (ref 0–0)
PHOSPHORUS: 2.9 MG/DL (ref 2.5–4.9)
PLATELET COUNT: 308 10^3/UL (ref 140–415)
POTASSIUM: 4.3 MMOL/L (ref 3.5–5.1)
RED BLOOD COUNT: 4.64 10^6/UL (ref 4.2–5.4)
RED CELL DISTRIBUTION WIDTH: 13.7 % (ref 11.5–14.5)
SODIUM: 137 MMOL/L (ref 135–144)
WHITE BLOOD COUNT: 8.5 10^3/UL (ref 4.8–10.8)

## 2019-06-19 RX ADMIN — ENOXAPARIN SODIUM 1 MG: 100 INJECTION SUBCUTANEOUS at 09:20

## 2019-06-19 RX ADMIN — TIOTROPIUM BROMIDE 1 INH: 18 CAPSULE ORAL; RESPIRATORY (INHALATION) at 09:20

## 2019-06-19 RX ADMIN — FAMOTIDINE 1 MG: 20 TABLET ORAL at 09:19

## 2019-06-19 RX ADMIN — FLUTICASONE FUROATE AND VILANTEROL TRIFENATATE SCH INH: 100; 25 POWDER RESPIRATORY (INHALATION) at 09:19

## 2019-06-19 RX ADMIN — ENOXAPARIN SODIUM SCH MG: 100 INJECTION SUBCUTANEOUS at 09:20

## 2019-06-19 RX ADMIN — LEVOFLOXACIN 1 MG: 250 TABLET, FILM COATED ORAL at 05:13

## 2019-06-19 RX ADMIN — FAMOTIDINE SCH MG: 20 TABLET ORAL at 09:19

## 2019-06-19 RX ADMIN — MAGNESIUM OXIDE TAB 400 MG (241.3 MG ELEMENTAL MG) 1 MG: 400 (241.3 MG) TAB at 13:40

## 2019-06-19 RX ADMIN — FLUTICASONE FUROATE AND VILANTEROL TRIFENATATE 1 INH: 100; 25 POWDER RESPIRATORY (INHALATION) at 09:19

## 2019-06-19 RX ADMIN — GUAIFENESIN AND DEXTROMETHORPHAN 1 ML: 100; 10 SYRUP ORAL at 05:15

## 2019-06-19 RX ADMIN — MAGNESIUM OXIDE TAB 400 MG (241.3 MG ELEMENTAL MG) 1 MG: 400 (241.3 MG) TAB at 09:19

## 2019-06-19 RX ADMIN — LEVOFLOXACIN SCH MG: 250 TABLET, FILM COATED ORAL at 05:13

## 2019-06-19 RX ADMIN — MAGNESIUM OXIDE TAB 400 MG (241.3 MG ELEMENTAL MG) SCH MG: 400 (241.3 MG) TAB at 09:19

## 2019-06-19 RX ADMIN — MAGNESIUM OXIDE TAB 400 MG (241.3 MG ELEMENTAL MG) SCH MG: 400 (241.3 MG) TAB at 13:40

## 2019-06-19 NOTE — PDOCDIS
Discharge Instructions


CONDITION


                [id number]
Patient Condition:  [id number]
Stable








FOLLOW UP/APPOINTMENTS


Follow-up Plan


1.  Please follow-up immediately to the [facility] clinic at [facility] [place], in


structions were given by our , you will need to see a pulmonologist


for your chronic lung diseases, our pulmonologist suspect you have severe 


chronic fibrotic lung disease


2.  Please follow-up with your primary care provider as soon as possible


3.  If you are not able to afford your prescriptions she will need to fill them 


at the [facility] clinic at [facility]











STEFAN MCGREGOR                    Jun 19, 2019 10:43

## 2019-06-19 NOTE — DS
Date/Time of Note


Date/Time of Note


DATE: 6/19/19 


TIME: 10:50





Discharge Summary


Admission/Discharge Info


Admit Date/Time


Jun 13, 2019 at 02:00


Discharge Date/Time





Patient Condition:  Stable


Hospital Course


Patient is a  female with a past medical history significant for a 


history of tuberculosis who presented to [facility] for 


shortness of breath.  Patient was diagnosed with pulmonary fibrosis versus other


severe fibrotic lung disease and had a long course in the hospital due to 


questionable TB rule out.  Pulmonology saw the patient and determined patient 


did not have active TB.  Patient will be discharged with proper inhalers as well


as antibiotics to cover urinary tract infection and possible pneumonia.  Patient


does not have insurance at this time however if from a walker will be provided 


for her, patient ambulance well with no issues on room air.  Patient was given 


extensive counseling and insight into how to follow-up at the [facility] for 


free prescriptions as well as free medical care.  He was instructed to follow-up


with a pulmonologist as soon as possible for her fibrotic lung disease.  Patient


is stable at discharge.





Discharge diagnosis


Urinary tract infection, resolving


COPD exacerbation, resolving


Pulmonary fibrosis, chronic


Grade 1 diastolic dysfunction


Home Meds


Active Scripts


Methylprednisolone* (Medrol* DOSE PACK) 4 Mg/Dose-Pack Tab.ds.pk, 4 MG PO .AS 


DIRECTED, #1 PACKET


   Prov:STEFAN MCGREGOR         6/19/19


Albuterol Sulfate (Proair Respiclick) 90 Mcg Aer.pow.ba, 1 PUFF INHALATION Q6 


PRN for WHEEZING, #1 BOTTLE


   Prov:STEFAN MCGREGOR         6/19/19


Montelukast Sodium* (Montelukast Sodium*) 10 Mg Tablet, 10 MG PO HS for 30 Days,


#30 TAB


   Prov:STEFAN MCGREGOR         6/19/19


Fluticasone-Vilanterol (Breo Ellipta Inhaler) 100-25 Mcg/Actuation Aer.pow.ba, 1


INH INH DAILY, #1 INHALER


   Prov:STEFAN MCGREGOR         6/19/19


Tiotropium Bromide* (Spiriva*) 18 Mcg Cap.w.dev, 1 INH INH DAILY, #1 INHALER


   Prov:STEFAN MCGREGOR         6/19/19


Levofloxacin* (Levofloxacin*) 250 Mg Tablet, 250 MG PO DAILY for 3 Days, #3 TAB


   Please start on 6/20/19


   Prov:STEFAN MCGREGOR         6/19/19


Discontinued Reported Medications


[Ultibro]   No Conflict Check, INH DAILY PRN for SHORTNESS OF BREATH


   6/13/19


Phenylephrine/Diphenhydramine (DIMETAPP COLD & CONGEST LIQUID) 118 Ml Liquid, 


118 ML PO


   6/13/19


Follow-up Plan


1.  Please follow-up immediately to the [facility] at [facility] [place] [facility], 


instructions were given by our , you will need to see a 


pulmonologist for your chronic lung diseases, our pulmonologist suspect you have


 severe chronic fibrotic lung disease


2.  Please follow-up with your primary care provider as soon as possible


3.  If you are not able to afford your prescriptions she will need to fill them 


at the [facility] at [facility]


Primary Care Provider


Care Physician No Primary


Time spent on discharge:   > 30 minutes


Pending Labs





Laboratory Tests


         Test
                                            6/19/19
05:40


         White Blood Count
                      8.5 10^3/ul
(4.8-10.8)


         Red Blood Count
                      4.64 10^6/ul
(4.20-5.40)


         Hemoglobin
                              13.9 g/dl
(12.0-16.0)


         Hematocrit
                                 42.8 %
(37.0-47.0)


         Mean Corpuscular Volume
                  92.2 fl
(82.0-101.0)


         Mean Corpuscular Hemoglobin
               30.0 pg
(29.0-33.0)


         Mean Corpuscular Hemoglobin
Concent      32.5 g/dl
(32.0-37.0)


         Red Cell Distribution Width
                13.7 %
(11.5-14.5)


         Platelet Count
                          308 10^3/UL
(140-415)


         Mean Platelet Volume
                        9.6 fl
(7.4-10.4)


         Immature Granulocytes %
                 3.200 %
(0.001-0.429)


         Neutrophils %
                              58.7 %
(39.0-77.0)


         Lymphocytes %
                              24.9 %
(15.0-51.0)


         Monocytes %
                                 11.0 %
(0.0-11.0)


         Eosinophils %
                                 1.3 %
(0.0-7.0)


         Basophils %
                                   0.9 %
(0.0-2.0)


         Nucleated Red Blood Cells %
             0.0 /100WBC
(0.0-0.0)


         Immature Granulocytes #
             0.270 10^3/ul
(0.0-0.031)


         Neutrophils #
                           5.0 10^3/ul
(1.6-7.5)


         Lymphocytes #
                           2.1 10^3/ul
(0.8-2.9)


         Monocytes #
                             0.9 10^3/ul
(0.3-0.9)


         Eosinophils #
                           0.1 10^3/ul
(0.0-0.5)


         Basophils #
                             0.1 10^3/ul
(0.0-0.1)


         Nucleated Red Blood Cells #
             0.0 10^3/ul
(0.0-0.0)


         Sodium Level
                             137 mmol/L
(135-144)


         Potassium Level
                          4.3 mmol/L
(3.5-5.1)


         Chloride Level
                            100 mmol/L
()


         Carbon Dioxide Level
                        34 mmol/L
(21-31)


         Anion Gap                                            3 (5-13)


         Blood Urea Nitrogen
                           17 mg/dl
(7-20)


         Creatinine
                             0.77 mg/dl
(0.44-1.00)


         Est Glomerular Filtrat Rate
mL/min    mL/min (>60) 



         Glucose Level
                              105 mg/dl
()


         Calcium Level
                            8.4 mg/dl
(8.4-10.2)


         Phosphorus Level
                          2.9 mg/dl
(2.5-4.9)


         Magnesium Level
                           2.4 mg/dl
(1.7-2.5)














STEFAN MCGREGOR                    Jun 19, 2019 10:50